# Patient Record
Sex: MALE | Race: BLACK OR AFRICAN AMERICAN | NOT HISPANIC OR LATINO | URBAN - METROPOLITAN AREA
[De-identification: names, ages, dates, MRNs, and addresses within clinical notes are randomized per-mention and may not be internally consistent; named-entity substitution may affect disease eponyms.]

---

## 2020-01-14 ENCOUNTER — ANESTHESIA EVENT (OUTPATIENT)
Dept: SURGERY | Facility: HOSPITAL | Age: 25
DRG: 356 | End: 2020-01-14
Payer: MEDICAID

## 2020-01-14 ENCOUNTER — HOSPITAL ENCOUNTER (INPATIENT)
Facility: HOSPITAL | Age: 25
LOS: 2 days | Discharge: HOME | DRG: 356 | End: 2020-01-16
Attending: EMERGENCY MEDICINE | Admitting: SURGERY
Payer: MEDICAID

## 2020-01-14 ENCOUNTER — ANESTHESIA (OUTPATIENT)
Dept: SURGERY | Facility: HOSPITAL | Age: 25
DRG: 356 | End: 2020-01-14
Payer: MEDICAID

## 2020-01-14 DIAGNOSIS — R10.10 PAIN OF UPPER ABDOMEN: ICD-10-CM

## 2020-01-14 DIAGNOSIS — F11.93 OPIATE WITHDRAWAL: ICD-10-CM

## 2020-01-14 DIAGNOSIS — F11.10 MILD TRAMADOL USE DISORDER: ICD-10-CM

## 2020-01-14 DIAGNOSIS — K56.1 INTUSSUSCEPTION: Primary | ICD-10-CM

## 2020-01-14 DIAGNOSIS — F12.10 MARIJUANA ABUSE: ICD-10-CM

## 2020-01-14 LAB
ALBUMIN SERPL BCP-MCNC: 4.7 G/DL (ref 3.5–5.2)
ALP SERPL-CCNC: 98 U/L (ref 55–135)
ALT SERPL W/O P-5'-P-CCNC: 15 U/L (ref 10–44)
ANION GAP SERPL CALC-SCNC: 9 MMOL/L (ref 8–16)
AST SERPL-CCNC: 24 U/L (ref 10–40)
BACTERIA #/AREA URNS AUTO: NORMAL /HPF
BASOPHILS # BLD AUTO: 0.02 K/UL (ref 0–0.2)
BASOPHILS NFR BLD: 0.2 % (ref 0–1.9)
BILIRUB SERPL-MCNC: 0.8 MG/DL (ref 0.1–1)
BILIRUB UR QL STRIP: NEGATIVE
BUN SERPL-MCNC: 10 MG/DL (ref 6–20)
CALCIUM SERPL-MCNC: 9.4 MG/DL (ref 8.7–10.5)
CHLORIDE SERPL-SCNC: 107 MMOL/L (ref 95–110)
CLARITY UR REFRACT.AUTO: CLEAR
CO2 SERPL-SCNC: 22 MMOL/L (ref 23–29)
COLOR UR AUTO: YELLOW
CREAT SERPL-MCNC: 0.8 MG/DL (ref 0.5–1.4)
DIFFERENTIAL METHOD: ABNORMAL
EOSINOPHIL # BLD AUTO: 0 K/UL (ref 0–0.5)
EOSINOPHIL NFR BLD: 0 % (ref 0–8)
ERYTHROCYTE [DISTWIDTH] IN BLOOD BY AUTOMATED COUNT: 13.7 % (ref 11.5–14.5)
EST. GFR  (AFRICAN AMERICAN): >60 ML/MIN/1.73 M^2
EST. GFR  (NON AFRICAN AMERICAN): >60 ML/MIN/1.73 M^2
GLUCOSE SERPL-MCNC: 154 MG/DL (ref 70–110)
GLUCOSE UR QL STRIP: NEGATIVE
HCT VFR BLD AUTO: 43.2 % (ref 40–54)
HGB BLD-MCNC: 14.1 G/DL (ref 14–18)
HGB UR QL STRIP: NEGATIVE
HYALINE CASTS UR QL AUTO: 1 /LPF
IMM GRANULOCYTES # BLD AUTO: 0.02 K/UL (ref 0–0.04)
IMM GRANULOCYTES NFR BLD AUTO: 0.2 % (ref 0–0.5)
KETONES UR QL STRIP: NEGATIVE
LEUKOCYTE ESTERASE UR QL STRIP: NEGATIVE
LIPASE SERPL-CCNC: 14 U/L (ref 4–60)
LYMPHOCYTES # BLD AUTO: 0.9 K/UL (ref 1–4.8)
LYMPHOCYTES NFR BLD: 9.6 % (ref 18–48)
MCH RBC QN AUTO: 31.4 PG (ref 27–31)
MCHC RBC AUTO-ENTMCNC: 32.6 G/DL (ref 32–36)
MCV RBC AUTO: 96 FL (ref 82–98)
MICROSCOPIC COMMENT: NORMAL
MONOCYTES # BLD AUTO: 0.3 K/UL (ref 0.3–1)
MONOCYTES NFR BLD: 3.2 % (ref 4–15)
NEUTROPHILS # BLD AUTO: 8.4 K/UL (ref 1.8–7.7)
NEUTROPHILS NFR BLD: 86.8 % (ref 38–73)
NITRITE UR QL STRIP: NEGATIVE
NON-SQ EPI CELLS #/AREA URNS AUTO: 0 /HPF
NRBC BLD-RTO: 0 /100 WBC
PH UR STRIP: 7 [PH] (ref 5–8)
PLATELET # BLD AUTO: 186 K/UL (ref 150–350)
PMV BLD AUTO: 11.5 FL (ref 9.2–12.9)
POTASSIUM SERPL-SCNC: 4.3 MMOL/L (ref 3.5–5.1)
PROT SERPL-MCNC: 7.9 G/DL (ref 6–8.4)
PROT UR QL STRIP: ABNORMAL
RBC # BLD AUTO: 4.49 M/UL (ref 4.6–6.2)
RBC #/AREA URNS AUTO: 2 /HPF (ref 0–4)
SODIUM SERPL-SCNC: 138 MMOL/L (ref 136–145)
SP GR UR STRIP: >1.03 (ref 1–1.03)
SQUAMOUS #/AREA URNS AUTO: 0 /HPF
URN SPEC COLLECT METH UR: ABNORMAL
WBC # BLD AUTO: 9.72 K/UL (ref 3.9–12.7)
WBC #/AREA URNS AUTO: 2 /HPF (ref 0–5)
WBC CLUMPS UR QL AUTO: NORMAL
YEAST UR QL AUTO: NORMAL

## 2020-01-14 PROCEDURE — 81001 URINALYSIS AUTO W/SCOPE: CPT

## 2020-01-14 PROCEDURE — 99222 PR INITIAL HOSPITAL CARE,LEVL II: ICD-10-PCS | Mod: 57,ICN,, | Performed by: SURGERY

## 2020-01-14 PROCEDURE — 99222 1ST HOSP IP/OBS MODERATE 55: CPT | Mod: 57,ICN,, | Performed by: SURGERY

## 2020-01-14 PROCEDURE — 36000706: Performed by: SURGERY

## 2020-01-14 PROCEDURE — 63600175 PHARM REV CODE 636 W HCPCS: Performed by: NURSE ANESTHETIST, CERTIFIED REGISTERED

## 2020-01-14 PROCEDURE — 99285 EMERGENCY DEPT VISIT HI MDM: CPT | Mod: 25

## 2020-01-14 PROCEDURE — 49000 EXPLORATION OF ABDOMEN: CPT | Mod: ,,, | Performed by: SURGERY

## 2020-01-14 PROCEDURE — 80053 COMPREHEN METABOLIC PANEL: CPT

## 2020-01-14 PROCEDURE — 37000009 HC ANESTHESIA EA ADD 15 MINS: Performed by: SURGERY

## 2020-01-14 PROCEDURE — 71000033 HC RECOVERY, INTIAL HOUR: Performed by: SURGERY

## 2020-01-14 PROCEDURE — G0378 HOSPITAL OBSERVATION PER HR: HCPCS

## 2020-01-14 PROCEDURE — 99285 PR EMERGENCY DEPT VISIT,LEVEL V: ICD-10-PCS | Mod: ,,, | Performed by: EMERGENCY MEDICINE

## 2020-01-14 PROCEDURE — D9220A PRA ANESTHESIA: ICD-10-PCS | Mod: ANES,,, | Performed by: ANESTHESIOLOGY

## 2020-01-14 PROCEDURE — 85025 COMPLETE CBC W/AUTO DIFF WBC: CPT

## 2020-01-14 PROCEDURE — D9220A PRA ANESTHESIA: Mod: ANES,,, | Performed by: ANESTHESIOLOGY

## 2020-01-14 PROCEDURE — 25500020 PHARM REV CODE 255: Performed by: EMERGENCY MEDICINE

## 2020-01-14 PROCEDURE — 63600175 PHARM REV CODE 636 W HCPCS: Performed by: EMERGENCY MEDICINE

## 2020-01-14 PROCEDURE — 49000 PR EXPLORATORY OF ABDOMEN: ICD-10-PCS | Mod: ,,, | Performed by: SURGERY

## 2020-01-14 PROCEDURE — 96361 HYDRATE IV INFUSION ADD-ON: CPT

## 2020-01-14 PROCEDURE — 25000003 PHARM REV CODE 250: Performed by: EMERGENCY MEDICINE

## 2020-01-14 PROCEDURE — 96375 TX/PRO/DX INJ NEW DRUG ADDON: CPT

## 2020-01-14 PROCEDURE — 96376 TX/PRO/DX INJ SAME DRUG ADON: CPT

## 2020-01-14 PROCEDURE — 36000707: Performed by: SURGERY

## 2020-01-14 PROCEDURE — 37000008 HC ANESTHESIA 1ST 15 MINUTES: Performed by: SURGERY

## 2020-01-14 PROCEDURE — 25500020 PHARM REV CODE 255: Performed by: STUDENT IN AN ORGANIZED HEALTH CARE EDUCATION/TRAINING PROGRAM

## 2020-01-14 PROCEDURE — 96372 THER/PROPH/DIAG INJ SC/IM: CPT | Mod: 59

## 2020-01-14 PROCEDURE — D9220A PRA ANESTHESIA: Mod: CRNA,,, | Performed by: NURSE ANESTHETIST, CERTIFIED REGISTERED

## 2020-01-14 PROCEDURE — 96366 THER/PROPH/DIAG IV INF ADDON: CPT

## 2020-01-14 PROCEDURE — 83690 ASSAY OF LIPASE: CPT

## 2020-01-14 PROCEDURE — 96365 THER/PROPH/DIAG IV INF INIT: CPT

## 2020-01-14 PROCEDURE — 99285 EMERGENCY DEPT VISIT HI MDM: CPT | Mod: ,,, | Performed by: EMERGENCY MEDICINE

## 2020-01-14 PROCEDURE — 12000002 HC ACUTE/MED SURGE SEMI-PRIVATE ROOM

## 2020-01-14 PROCEDURE — D9220A PRA ANESTHESIA: ICD-10-PCS | Mod: CRNA,,, | Performed by: NURSE ANESTHETIST, CERTIFIED REGISTERED

## 2020-01-14 PROCEDURE — S0028 INJECTION, FAMOTIDINE, 20 MG: HCPCS | Performed by: EMERGENCY MEDICINE

## 2020-01-14 RX ORDER — ONDANSETRON 4 MG/1
4 TABLET, ORALLY DISINTEGRATING ORAL EVERY 8 HOURS PRN
Qty: 12 TABLET | Refills: 0 | Status: SHIPPED | OUTPATIENT
Start: 2020-01-14 | End: 2020-01-16 | Stop reason: SDUPTHER

## 2020-01-14 RX ORDER — MORPHINE SULFATE 4 MG/ML
4 INJECTION, SOLUTION INTRAMUSCULAR; INTRAVENOUS EVERY 4 HOURS PRN
Status: DISCONTINUED | OUTPATIENT
Start: 2020-01-14 | End: 2020-01-15

## 2020-01-14 RX ORDER — TALC
6 POWDER (GRAM) TOPICAL NIGHTLY PRN
Status: DISCONTINUED | OUTPATIENT
Start: 2020-01-14 | End: 2020-01-16 | Stop reason: HOSPADM

## 2020-01-14 RX ORDER — DICYCLOMINE HYDROCHLORIDE 20 MG/1
20 TABLET ORAL 2 TIMES DAILY
Qty: 20 TABLET | Refills: 0 | Status: SHIPPED | OUTPATIENT
Start: 2020-01-14 | End: 2020-01-14 | Stop reason: SDUPTHER

## 2020-01-14 RX ORDER — MORPHINE SULFATE 2 MG/ML
2 INJECTION, SOLUTION INTRAMUSCULAR; INTRAVENOUS EVERY 4 HOURS PRN
Status: DISCONTINUED | OUTPATIENT
Start: 2020-01-14 | End: 2020-01-15

## 2020-01-14 RX ORDER — DEXTROSE MONOHYDRATE, SODIUM CHLORIDE, AND POTASSIUM CHLORIDE 50; 1.49; 4.5 G/1000ML; G/1000ML; G/1000ML
1000 INJECTION, SOLUTION INTRAVENOUS
Status: COMPLETED | OUTPATIENT
Start: 2020-01-14 | End: 2020-01-14

## 2020-01-14 RX ORDER — ONDANSETRON 2 MG/ML
8 INJECTION INTRAMUSCULAR; INTRAVENOUS
Status: COMPLETED | OUTPATIENT
Start: 2020-01-14 | End: 2020-01-14

## 2020-01-14 RX ORDER — SODIUM CHLORIDE, SODIUM LACTATE, POTASSIUM CHLORIDE, CALCIUM CHLORIDE 600; 310; 30; 20 MG/100ML; MG/100ML; MG/100ML; MG/100ML
INJECTION, SOLUTION INTRAVENOUS CONTINUOUS
Status: DISCONTINUED | OUTPATIENT
Start: 2020-01-14 | End: 2020-01-16

## 2020-01-14 RX ORDER — ACETAMINOPHEN 325 MG/1
650 TABLET ORAL EVERY 8 HOURS PRN
Status: DISCONTINUED | OUTPATIENT
Start: 2020-01-14 | End: 2020-01-16

## 2020-01-14 RX ORDER — ONDANSETRON 2 MG/ML
4 INJECTION INTRAMUSCULAR; INTRAVENOUS EVERY 6 HOURS PRN
Status: DISCONTINUED | OUTPATIENT
Start: 2020-01-14 | End: 2020-01-16 | Stop reason: HOSPADM

## 2020-01-14 RX ORDER — LORAZEPAM 2 MG/ML
1 INJECTION INTRAMUSCULAR
Status: COMPLETED | OUTPATIENT
Start: 2020-01-14 | End: 2020-01-14

## 2020-01-14 RX ORDER — ONDANSETRON 4 MG/1
4 TABLET, ORALLY DISINTEGRATING ORAL EVERY 8 HOURS PRN
Qty: 12 TABLET | Refills: 0 | Status: SHIPPED | OUTPATIENT
Start: 2020-01-14 | End: 2020-01-14 | Stop reason: SDUPTHER

## 2020-01-14 RX ORDER — DICYCLOMINE HYDROCHLORIDE 20 MG/1
20 TABLET ORAL 2 TIMES DAILY
Qty: 20 TABLET | Refills: 0 | Status: SHIPPED | OUTPATIENT
Start: 2020-01-14 | End: 2020-01-16 | Stop reason: SDUPTHER

## 2020-01-14 RX ORDER — ONDANSETRON 2 MG/ML
4 INJECTION INTRAMUSCULAR; INTRAVENOUS
Status: COMPLETED | OUTPATIENT
Start: 2020-01-14 | End: 2020-01-14

## 2020-01-14 RX ORDER — SODIUM CHLORIDE 0.9 % (FLUSH) 0.9 %
10 SYRINGE (ML) INJECTION
Status: DISCONTINUED | OUTPATIENT
Start: 2020-01-14 | End: 2020-01-16 | Stop reason: HOSPADM

## 2020-01-14 RX ORDER — FAMOTIDINE 10 MG/ML
20 INJECTION INTRAVENOUS
Status: COMPLETED | OUTPATIENT
Start: 2020-01-14 | End: 2020-01-14

## 2020-01-14 RX ORDER — DICYCLOMINE HYDROCHLORIDE 10 MG/ML
20 INJECTION INTRAMUSCULAR
Status: COMPLETED | OUTPATIENT
Start: 2020-01-14 | End: 2020-01-14

## 2020-01-14 RX ADMIN — IOHEXOL 75 ML: 350 INJECTION, SOLUTION INTRAVENOUS at 03:01

## 2020-01-14 RX ADMIN — ROCURONIUM BROMIDE 10 MG: 10 INJECTION, SOLUTION INTRAVENOUS at 11:01

## 2020-01-14 RX ADMIN — DEXTROSE, SODIUM CHLORIDE, AND POTASSIUM CHLORIDE 1000 ML: 5; .45; .15 INJECTION INTRAVENOUS at 06:01

## 2020-01-14 RX ADMIN — ONDANSETRON 4 MG: 2 INJECTION INTRAMUSCULAR; INTRAVENOUS at 05:01

## 2020-01-14 RX ADMIN — FENTANYL CITRATE 50 MCG: 50 INJECTION, SOLUTION INTRAMUSCULAR; INTRAVENOUS at 11:01

## 2020-01-14 RX ADMIN — DICYCLOMINE HYDROCHLORIDE 20 MG: 20 INJECTION, SOLUTION INTRAMUSCULAR at 09:01

## 2020-01-14 RX ADMIN — ONDANSETRON 8 MG: 2 INJECTION INTRAMUSCULAR; INTRAVENOUS at 12:01

## 2020-01-14 RX ADMIN — FAMOTIDINE 20 MG: 10 INJECTION, SOLUTION INTRAVENOUS at 12:01

## 2020-01-14 RX ADMIN — LIDOCAINE HYDROCHLORIDE 100 MG: 20 INJECTION, SOLUTION INTRAVENOUS at 11:01

## 2020-01-14 RX ADMIN — LORAZEPAM 1 MG: 2 INJECTION INTRAMUSCULAR; INTRAVENOUS at 05:01

## 2020-01-14 RX ADMIN — SODIUM CHLORIDE 1000 ML: 0.9 INJECTION, SOLUTION INTRAVENOUS at 12:01

## 2020-01-14 RX ADMIN — PROMETHAZINE HYDROCHLORIDE 12.5 MG: 25 INJECTION INTRAMUSCULAR; INTRAVENOUS at 09:01

## 2020-01-14 RX ADMIN — SODIUM CHLORIDE 1000 ML: 0.9 INJECTION, SOLUTION INTRAVENOUS at 03:01

## 2020-01-14 RX ADMIN — SUCCINYLCHOLINE CHLORIDE 140 MG: 20 INJECTION, SOLUTION INTRAMUSCULAR; INTRAVENOUS at 11:01

## 2020-01-14 RX ADMIN — SODIUM CHLORIDE, SODIUM GLUCONATE, SODIUM ACETATE, POTASSIUM CHLORIDE, MAGNESIUM CHLORIDE, SODIUM PHOSPHATE, DIBASIC, AND POTASSIUM PHOSPHATE: .53; .5; .37; .037; .03; .012; .00082 INJECTION, SOLUTION INTRAVENOUS at 11:01

## 2020-01-14 RX ADMIN — MIDAZOLAM HYDROCHLORIDE 2 MG: 1 INJECTION, SOLUTION INTRAMUSCULAR; INTRAVENOUS at 11:01

## 2020-01-14 RX ADMIN — IOHEXOL 15 ML: 350 INJECTION, SOLUTION INTRAVENOUS at 09:01

## 2020-01-14 RX ADMIN — PROMETHAZINE HYDROCHLORIDE 12.5 MG: 25 INJECTION INTRAMUSCULAR; INTRAVENOUS at 01:01

## 2020-01-14 RX ADMIN — PROPOFOL 200 MG: 10 INJECTION, EMULSION INTRAVENOUS at 11:01

## 2020-01-14 NOTE — ED PROVIDER NOTES
Encounter Date: 1/14/2020       History     Chief Complaint   Patient presents with    Nausea     N/V and abdominal pain onset 3pm, pt diaphoretic     24-year-old male with no significant past medical history presents the emergency room for evaluation of nausea vomiting diarrhea that started this morning.  Patient denies any significant abdominal pain however does have discomfort when he is actually vomiting.  He denies any black or bloody emesis or hematochezia or melena.  Patient does smoke several blunts of marijuana a day.  He has no history of hyperemesis cannabinoid syndrome.  Showers do not improve his symptoms. He denies any fevers but states he has chills all over.  He denies any dysuria headache chest pain shortness of breath back pain hematuria history of kidney stones penile or scrotal pain. He has no sick contacts recent travel or all could foods.  He denies any new medicines.  He denies any other drug use.  He does not take any over-the-counter anti-inflammatories, has no history of ulcers and is not an excessive drinker.        Review of patient's allergies indicates:  No Known Allergies  History reviewed. No pertinent past medical history.  History reviewed. No pertinent surgical history.  No family history on file.  Social History     Tobacco Use    Smoking status: Unknown If Ever Smoked   Substance Use Topics    Alcohol use: Yes     Frequency: Never    Drug use: Yes     Types: Marijuana     Review of Systems   Constitutional: Negative for fever.   HENT: Negative for ear pain, rhinorrhea and sore throat.    Eyes: Negative for pain and visual disturbance.   Respiratory: Negative for cough and shortness of breath.    Cardiovascular: Negative for chest pain.   Gastrointestinal: Positive for abdominal pain (Only when vomiting), diarrhea, nausea and vomiting.   Genitourinary: Negative for difficulty urinating, dysuria, flank pain and hematuria.   Musculoskeletal: Negative for arthralgias.   Skin:  Negative for rash.   Neurological: Negative for weakness, numbness and headaches.   Psychiatric/Behavioral: Negative for agitation and confusion.   All other systems reviewed and are negative.      Physical Exam     Initial Vitals [01/14/20 1122]   BP Pulse Resp Temp SpO2   138/86 62 16 98.6 °F (37 °C) 100 %      MAP       --         Physical Exam    Nursing note and vitals reviewed.  Constitutional: He appears well-developed and well-nourished. He is diaphoretic. No distress.   HENT:   Head: Normocephalic and atraumatic.   Mouth/Throat: Oropharynx is clear and moist.   Eyes: Conjunctivae and EOM are normal. Pupils are equal, round, and reactive to light. No scleral icterus.   Neck: Normal range of motion. Neck supple.   Cardiovascular: Normal rate, regular rhythm, normal heart sounds and intact distal pulses. Exam reveals no gallop and no friction rub.    No murmur heard.  Pulmonary/Chest: Breath sounds normal. He has no wheezes. He has no rhonchi. He has no rales.   Abdominal: Soft. Bowel sounds are normal. There is tenderness (Thin patient but diffusely firm abdomen.  He denies any significant pain however upon palpation).   Musculoskeletal: Normal range of motion. He exhibits no edema.   Neurological: He is alert and oriented to person, place, and time. He has normal strength. No sensory deficit. GCS score is 15. GCS eye subscore is 4. GCS verbal subscore is 5. GCS motor subscore is 6.   Tremulous   Skin: Skin is warm. No rash noted.   Psychiatric: He has a normal mood and affect.         ED Course   Procedures  Labs Reviewed   CBC W/ AUTO DIFFERENTIAL - Abnormal; Notable for the following components:       Result Value    RBC 4.49 (*)     Mean Corpuscular Hemoglobin 31.4 (*)     Gran # (ANC) 8.4 (*)     Lymph # 0.9 (*)     Gran% 86.8 (*)     Lymph% 9.6 (*)     Mono% 3.2 (*)     All other components within normal limits   COMPREHENSIVE METABOLIC PANEL   LIPASE   URINALYSIS, REFLEX TO URINE CULTURE           Imaging Results    None                            ED Course as of Jan 16 0128   Tue Jan 14, 2020   1424 Nausea vomiting is improved.  Awaiting CT scan at this time    [JS]   1600 Patient now admits to taking 4-5 tramadol 50 mg tablets a day for the past month if not longer.  I suspect he is having opiate withdrawal.  Labs are stable on CT is pending.  I will give him Bentyl here.  He is able tolerate fluids.  I will give him outpatient detox resources.    [JS]   1619 Surprisingly CT was readAs possible intussusception.  Patient does feel slightly better.  I still think this is opiate withdrawal.  I will discuss the case with surgery.    [JS]   1715 GeneralSurgery saw the patient wants repeat CT scan with oral with oral contrast only to see if the intussusception has improved.    [JS]   1757 Awaiting repeat ct scan    [JS]   2127 Surgery will review ct scan.  If negative for intussusception pt can go home if feeling better.  If present and persistent pain will likely admit to surgery.  Rx for bentyl and zofran prescribed.     [JS]   2130 Care transferred to Dr. Ontiveros    [JS]      ED Course User Index  [JS] Rodney Simpson MD                Clinical Impression:       ICD-10-CM ICD-9-CM   1. Intussusception K56.1 560.0   2. Opiate withdrawal F11.23 292.0     304.00   3. Pain of upper abdomen R10.10 789.09   4. 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 Marijuana abuse F12.10 305.20                             Rodney Simpson MD  01/16/20 0129

## 2020-01-14 NOTE — ED NOTES
Pt. Resting in bed in NAD. RR e/u. Continuous cardiac, BP, and O2 monitoring in progress. VS being monitoring continuously per MD orders.Pt aware waiting for CT results.

## 2020-01-14 NOTE — ED TRIAGE NOTES
Pt presents to ed reports vomiting this morning and abdominal pain. Pt presents diaphoretic. Pt denies drug use.

## 2020-01-15 PROBLEM — F12.10 MARIJUANA ABUSE: Status: ACTIVE | Noted: 2020-01-15

## 2020-01-15 PROBLEM — F11.10: Status: ACTIVE | Noted: 2020-01-15

## 2020-01-15 LAB
ALBUMIN SERPL BCP-MCNC: 4.1 G/DL (ref 3.5–5.2)
ALP SERPL-CCNC: 83 U/L (ref 55–135)
ALT SERPL W/O P-5'-P-CCNC: 11 U/L (ref 10–44)
ANION GAP SERPL CALC-SCNC: 10 MMOL/L (ref 8–16)
AST SERPL-CCNC: 23 U/L (ref 10–40)
BASOPHILS # BLD AUTO: 0.02 K/UL (ref 0–0.2)
BASOPHILS NFR BLD: 0.1 % (ref 0–1.9)
BILIRUB SERPL-MCNC: 0.9 MG/DL (ref 0.1–1)
BUN SERPL-MCNC: 7 MG/DL (ref 6–20)
CALCIUM SERPL-MCNC: 8.9 MG/DL (ref 8.7–10.5)
CHLORIDE SERPL-SCNC: 105 MMOL/L (ref 95–110)
CO2 SERPL-SCNC: 23 MMOL/L (ref 23–29)
CREAT SERPL-MCNC: 0.8 MG/DL (ref 0.5–1.4)
DIFFERENTIAL METHOD: ABNORMAL
EOSINOPHIL # BLD AUTO: 0 K/UL (ref 0–0.5)
EOSINOPHIL NFR BLD: 0 % (ref 0–8)
ERYTHROCYTE [DISTWIDTH] IN BLOOD BY AUTOMATED COUNT: 13.8 % (ref 11.5–14.5)
EST. GFR  (AFRICAN AMERICAN): >60 ML/MIN/1.73 M^2
EST. GFR  (NON AFRICAN AMERICAN): >60 ML/MIN/1.73 M^2
GLUCOSE SERPL-MCNC: 143 MG/DL (ref 70–110)
HCT VFR BLD AUTO: 39.6 % (ref 40–54)
HGB BLD-MCNC: 13.2 G/DL (ref 14–18)
IMM GRANULOCYTES # BLD AUTO: 0.09 K/UL (ref 0–0.04)
IMM GRANULOCYTES NFR BLD AUTO: 0.5 % (ref 0–0.5)
INR PPP: 1.1 (ref 0.8–1.2)
LYMPHOCYTES # BLD AUTO: 0.8 K/UL (ref 1–4.8)
LYMPHOCYTES NFR BLD: 4.7 % (ref 18–48)
MAGNESIUM SERPL-MCNC: 1.7 MG/DL (ref 1.6–2.6)
MCH RBC QN AUTO: 31.6 PG (ref 27–31)
MCHC RBC AUTO-ENTMCNC: 33.3 G/DL (ref 32–36)
MCV RBC AUTO: 95 FL (ref 82–98)
MONOCYTES # BLD AUTO: 0.5 K/UL (ref 0.3–1)
MONOCYTES NFR BLD: 2.8 % (ref 4–15)
NEUTROPHILS # BLD AUTO: 16.1 K/UL (ref 1.8–7.7)
NEUTROPHILS NFR BLD: 91.9 % (ref 38–73)
NRBC BLD-RTO: 0 /100 WBC
PHOSPHATE SERPL-MCNC: 3 MG/DL (ref 2.7–4.5)
PLATELET # BLD AUTO: 173 K/UL (ref 150–350)
PLATELET BLD QL SMEAR: ABNORMAL
PMV BLD AUTO: 12.1 FL (ref 9.2–12.9)
POTASSIUM SERPL-SCNC: 3.7 MMOL/L (ref 3.5–5.1)
PROT SERPL-MCNC: 6.9 G/DL (ref 6–8.4)
PROTHROMBIN TIME: 10.9 SEC (ref 9–12.5)
RBC # BLD AUTO: 4.18 M/UL (ref 4.6–6.2)
SODIUM SERPL-SCNC: 138 MMOL/L (ref 136–145)
WBC # BLD AUTO: 17.58 K/UL (ref 3.9–12.7)

## 2020-01-15 PROCEDURE — 36415 COLL VENOUS BLD VENIPUNCTURE: CPT

## 2020-01-15 PROCEDURE — 96375 TX/PRO/DX INJ NEW DRUG ADDON: CPT | Performed by: EMERGENCY MEDICINE

## 2020-01-15 PROCEDURE — 25000003 PHARM REV CODE 250: Performed by: NURSE ANESTHETIST, CERTIFIED REGISTERED

## 2020-01-15 PROCEDURE — 85025 COMPLETE CBC W/AUTO DIFF WBC: CPT

## 2020-01-15 PROCEDURE — 63600175 PHARM REV CODE 636 W HCPCS: Performed by: NURSE ANESTHETIST, CERTIFIED REGISTERED

## 2020-01-15 PROCEDURE — 83735 ASSAY OF MAGNESIUM: CPT

## 2020-01-15 PROCEDURE — C9290 INJ, BUPIVACAINE LIPOSOME: HCPCS | Performed by: SURGERY

## 2020-01-15 PROCEDURE — 85610 PROTHROMBIN TIME: CPT

## 2020-01-15 PROCEDURE — 84100 ASSAY OF PHOSPHORUS: CPT

## 2020-01-15 PROCEDURE — 12000002 HC ACUTE/MED SURGE SEMI-PRIVATE ROOM

## 2020-01-15 PROCEDURE — 63600175 PHARM REV CODE 636 W HCPCS: Performed by: STUDENT IN AN ORGANIZED HEALTH CARE EDUCATION/TRAINING PROGRAM

## 2020-01-15 PROCEDURE — S0028 INJECTION, FAMOTIDINE, 20 MG: HCPCS | Performed by: NURSE ANESTHETIST, CERTIFIED REGISTERED

## 2020-01-15 PROCEDURE — 25000242 PHARM REV CODE 250 ALT 637 W/ HCPCS: Performed by: NURSE ANESTHETIST, CERTIFIED REGISTERED

## 2020-01-15 PROCEDURE — 80053 COMPREHEN METABOLIC PANEL: CPT

## 2020-01-15 PROCEDURE — 63600175 PHARM REV CODE 636 W HCPCS: Performed by: SURGERY

## 2020-01-15 PROCEDURE — 99233 SBSQ HOSP IP/OBS HIGH 50: CPT | Mod: ,,, | Performed by: PSYCHIATRY & NEUROLOGY

## 2020-01-15 PROCEDURE — 99233 PR SUBSEQUENT HOSPITAL CARE,LEVL III: ICD-10-PCS | Mod: ,,, | Performed by: PSYCHIATRY & NEUROLOGY

## 2020-01-15 RX ORDER — CEFAZOLIN SODIUM 1 G/3ML
INJECTION, POWDER, FOR SOLUTION INTRAMUSCULAR; INTRAVENOUS
Status: DISCONTINUED | OUTPATIENT
Start: 2020-01-15 | End: 2020-01-15

## 2020-01-15 RX ORDER — ALBUTEROL SULFATE 90 UG/1
AEROSOL, METERED RESPIRATORY (INHALATION)
Status: DISCONTINUED | OUTPATIENT
Start: 2020-01-15 | End: 2020-01-15

## 2020-01-15 RX ORDER — HYDROMORPHONE HYDROCHLORIDE 1 MG/ML
0.2 INJECTION, SOLUTION INTRAMUSCULAR; INTRAVENOUS; SUBCUTANEOUS EVERY 5 MIN PRN
Status: DISCONTINUED | OUTPATIENT
Start: 2020-01-15 | End: 2020-01-15 | Stop reason: HOSPADM

## 2020-01-15 RX ORDER — NEOSTIGMINE METHYLSULFATE 0.5 MG/ML
INJECTION, SOLUTION INTRAVENOUS
Status: DISCONTINUED | OUTPATIENT
Start: 2020-01-15 | End: 2020-01-15

## 2020-01-15 RX ORDER — FENTANYL CITRATE 50 UG/ML
INJECTION, SOLUTION INTRAMUSCULAR; INTRAVENOUS
Status: DISCONTINUED | OUTPATIENT
Start: 2020-01-14 | End: 2020-01-15

## 2020-01-15 RX ORDER — LIDOCAINE HCL/PF 100 MG/5ML
SYRINGE (ML) INTRAVENOUS
Status: DISCONTINUED | OUTPATIENT
Start: 2020-01-14 | End: 2020-01-15

## 2020-01-15 RX ORDER — SODIUM CHLORIDE 0.9 % (FLUSH) 0.9 %
3 SYRINGE (ML) INJECTION
Status: DISCONTINUED | OUTPATIENT
Start: 2020-01-15 | End: 2020-01-15 | Stop reason: HOSPADM

## 2020-01-15 RX ORDER — GLYCOPYRROLATE 0.2 MG/ML
INJECTION INTRAMUSCULAR; INTRAVENOUS
Status: DISCONTINUED | OUTPATIENT
Start: 2020-01-15 | End: 2020-01-15

## 2020-01-15 RX ORDER — ACETAMINOPHEN 10 MG/ML
INJECTION, SOLUTION INTRAVENOUS
Status: DISCONTINUED | OUTPATIENT
Start: 2020-01-15 | End: 2020-01-15

## 2020-01-15 RX ORDER — LABETALOL HYDROCHLORIDE 5 MG/ML
INJECTION, SOLUTION INTRAVENOUS
Status: DISCONTINUED | OUTPATIENT
Start: 2020-01-15 | End: 2020-01-15

## 2020-01-15 RX ORDER — DEXMEDETOMIDINE HYDROCHLORIDE 100 UG/ML
INJECTION, SOLUTION INTRAVENOUS
Status: DISCONTINUED | OUTPATIENT
Start: 2020-01-15 | End: 2020-01-15

## 2020-01-15 RX ORDER — NALOXONE HCL 0.4 MG/ML
0.02 VIAL (ML) INJECTION
Status: DISCONTINUED | OUTPATIENT
Start: 2020-01-15 | End: 2020-01-16

## 2020-01-15 RX ORDER — ROCURONIUM BROMIDE 10 MG/ML
INJECTION, SOLUTION INTRAVENOUS
Status: DISCONTINUED | OUTPATIENT
Start: 2020-01-14 | End: 2020-01-15

## 2020-01-15 RX ORDER — HYDROMORPHONE HCL IN 0.9% NACL 6 MG/30 ML
PATIENT CONTROLLED ANALGESIA SYRINGE INTRAVENOUS
Status: DISPENSED
Start: 2020-01-15 | End: 2020-01-15

## 2020-01-15 RX ORDER — ONDANSETRON 2 MG/ML
INJECTION INTRAMUSCULAR; INTRAVENOUS
Status: DISCONTINUED | OUTPATIENT
Start: 2020-01-15 | End: 2020-01-15

## 2020-01-15 RX ORDER — FAMOTIDINE 10 MG/ML
INJECTION INTRAVENOUS
Status: DISCONTINUED | OUTPATIENT
Start: 2020-01-15 | End: 2020-01-15

## 2020-01-15 RX ORDER — PHENYLEPHRINE HYDROCHLORIDE 10 MG/ML
INJECTION INTRAVENOUS
Status: DISCONTINUED | OUTPATIENT
Start: 2020-01-15 | End: 2020-01-15

## 2020-01-15 RX ORDER — SUCCINYLCHOLINE CHLORIDE 20 MG/ML
INJECTION INTRAMUSCULAR; INTRAVENOUS
Status: DISCONTINUED | OUTPATIENT
Start: 2020-01-14 | End: 2020-01-15

## 2020-01-15 RX ORDER — MIDAZOLAM HYDROCHLORIDE 1 MG/ML
INJECTION, SOLUTION INTRAMUSCULAR; INTRAVENOUS
Status: DISCONTINUED | OUTPATIENT
Start: 2020-01-14 | End: 2020-01-15

## 2020-01-15 RX ORDER — HYDROMORPHONE HCL IN 0.9% NACL 6 MG/30 ML
PATIENT CONTROLLED ANALGESIA SYRINGE INTRAVENOUS CONTINUOUS
Status: DISCONTINUED | OUTPATIENT
Start: 2020-01-15 | End: 2020-01-16

## 2020-01-15 RX ORDER — ESMOLOL HYDROCHLORIDE 10 MG/ML
INJECTION INTRAVENOUS
Status: DISCONTINUED | OUTPATIENT
Start: 2020-01-15 | End: 2020-01-15

## 2020-01-15 RX ORDER — DEXAMETHASONE SODIUM PHOSPHATE 4 MG/ML
INJECTION, SOLUTION INTRA-ARTICULAR; INTRALESIONAL; INTRAMUSCULAR; INTRAVENOUS; SOFT TISSUE
Status: DISCONTINUED | OUTPATIENT
Start: 2020-01-15 | End: 2020-01-15

## 2020-01-15 RX ORDER — PROPOFOL 10 MG/ML
VIAL (ML) INTRAVENOUS
Status: DISCONTINUED | OUTPATIENT
Start: 2020-01-14 | End: 2020-01-15

## 2020-01-15 RX ADMIN — GLYCOPYRROLATE 0.4 MG: 0.2 INJECTION, SOLUTION INTRAMUSCULAR; INTRAVENOUS at 01:01

## 2020-01-15 RX ADMIN — SODIUM CHLORIDE, SODIUM GLUCONATE, SODIUM ACETATE, POTASSIUM CHLORIDE, MAGNESIUM CHLORIDE, SODIUM PHOSPHATE, DIBASIC, AND POTASSIUM PHOSPHATE: .53; .5; .37; .037; .03; .012; .00082 INJECTION, SOLUTION INTRAVENOUS at 12:01

## 2020-01-15 RX ADMIN — CEFAZOLIN 2 G: 330 INJECTION, POWDER, FOR SOLUTION INTRAMUSCULAR; INTRAVENOUS at 12:01

## 2020-01-15 RX ADMIN — DEXAMETHASONE SODIUM PHOSPHATE 8 MG: 4 INJECTION, SOLUTION INTRAMUSCULAR; INTRAVENOUS at 12:01

## 2020-01-15 RX ADMIN — ESMOLOL HYDROCHLORIDE 20 MG: 10 INJECTION INTRAVENOUS at 12:01

## 2020-01-15 RX ADMIN — ALBUTEROL SULFATE 6 PUFF: 90 AEROSOL, METERED RESPIRATORY (INHALATION) at 12:01

## 2020-01-15 RX ADMIN — ONDANSETRON 4 MG: 2 INJECTION INTRAMUSCULAR; INTRAVENOUS at 12:01

## 2020-01-15 RX ADMIN — Medication: at 01:01

## 2020-01-15 RX ADMIN — METHOCARBAMOL 500 MG: 100 INJECTION INTRAMUSCULAR; INTRAVENOUS at 04:01

## 2020-01-15 RX ADMIN — ACETAMINOPHEN 1000 MG: 10 INJECTION, SOLUTION INTRAVENOUS at 12:01

## 2020-01-15 RX ADMIN — FENTANYL CITRATE 50 MCG: 50 INJECTION, SOLUTION INTRAMUSCULAR; INTRAVENOUS at 12:01

## 2020-01-15 RX ADMIN — LABETALOL HYDROCHLORIDE 5 MG: 5 INJECTION, SOLUTION INTRAVENOUS at 12:01

## 2020-01-15 RX ADMIN — SODIUM CHLORIDE, SODIUM LACTATE, POTASSIUM CHLORIDE, AND CALCIUM CHLORIDE: .6; .31; .03; .02 INJECTION, SOLUTION INTRAVENOUS at 01:01

## 2020-01-15 RX ADMIN — PHENYLEPHRINE HYDROCHLORIDE 100 MCG: 10 INJECTION INTRAVENOUS at 12:01

## 2020-01-15 RX ADMIN — FENTANYL CITRATE 50 MCG: 50 INJECTION, SOLUTION INTRAMUSCULAR; INTRAVENOUS at 01:01

## 2020-01-15 RX ADMIN — ROCURONIUM BROMIDE 30 MG: 10 INJECTION, SOLUTION INTRAVENOUS at 12:01

## 2020-01-15 RX ADMIN — NEOSTIGMINE METHYLSULFATE 4 MG: 0.5 INJECTION INTRAVENOUS at 01:01

## 2020-01-15 RX ADMIN — DEXMEDETOMIDINE HYDROCHLORIDE 20 MCG: 100 INJECTION, SOLUTION, CONCENTRATE INTRAVENOUS at 12:01

## 2020-01-15 RX ADMIN — FAMOTIDINE 20 MG: 10 INJECTION, SOLUTION INTRAVENOUS at 12:01

## 2020-01-15 RX ADMIN — ESMOLOL HYDROCHLORIDE 10 MG: 10 INJECTION INTRAVENOUS at 12:01

## 2020-01-15 NOTE — BRIEF OP NOTE
[facility]  Surgery Department  Operative Note    SUMMARY     Date of Procedure: 1/14/2020     Procedure: Procedure(s) (LRB):  LAPAROTOMY, EXPLORATORY (N/A)     Surgeon(s) and Role:     * Booker Irvin MD - Primary     * Maggi Barrett MD - Resident - Assisting        Pre-Operative Diagnosis: Intussusception [K56.1]    Post-Operative Diagnosis: Post-Op Diagnosis Codes:     * Intussusception [K56.1]    Anesthesia: Choice    Technical Procedures Used: Exploratory laparotomy    Description of the Findings of the Procedure: No acute pathology found. Bowel run from terminal ileum to ligament of Treitz several times. NG tube placement confirmed in OR.    Significant Surgical Tasks Conducted by the Assistant(s), if Applicable: n/a    Complications: No    Estimated Blood Loss (EBL): * No values recorded between 1/15/2020 12:17 AM and 1/15/2020  1:08 AM *           Implants: * No implants in log *    Specimens:   Specimen (12h ago, onward)    None                  Condition: Good    Disposition: PACU - hemodynamically stable.    Attestation: I was present and scrubbed for the entire procedure.

## 2020-01-15 NOTE — PLAN OF CARE
01/15/20 1454   Post-Acute Status   Post-Acute Authorization Placement;Other   Post-Acute Placement Status Awaiting Internal Medical Clearance     Patient not medically ready at this time, Post acute needs to be determined. SW will continue to follow up.    Monserrat Dash LMSW  [facility]   [id number]

## 2020-01-15 NOTE — NURSING TRANSFER
Nursing Transfer Note      1/15/2020     Transfer To: 545 B from PACU    Transfer via stretcher    Transfer with IV pole, patients briefs in plastic bag    Transported by Transport    Medicines sent: LR infusing, Dilaudid PCA, Et monitor    Chart send with patient: Yes    Notified: Km notified, waiting in patient room    Patient reassessed at: 1/15/2020 2:30am

## 2020-01-15 NOTE — HPI
ADDICTION CONSULT INITIAL EVALUATION     DEPARTMENT:  Psychiatry  SITE: [facility] [address]    DATE OF ADMISSION: 1/14/2020 12:02 PM  LENGTH OF STAY: 0 days    EXAMINING PRACTITIONER: Cristobal Rivas    CONSULT REQUESTED BY: Booker Irvin MD      SUBJECTIVE     CHIEF COMPLAINT  Fenrando Massey is a 24 y.o. male who is seen today for an initial psychiatric evaluation by the addiction psychiatry consult service.  Fernando Massey presents with the chief complaint of: tramadol w/d      HISTORY OF PRESENT ILLNESS    Per Primary MD:  Patient is a 24 y.o. male with a past medical history significant for opoid abuse who presented to the ED after acute onset abdominal pain earlier today that woke him from sleep. He describes it as a dull, continuous deep pain which he has never experienced before. The pain was associated with vomiting. He does use tramadol recreationally and was taking roughly 8-9 pills per day over the past several days, last use was 2 days ago. He is still passing flatus and having bowel movements. Upon arrival to the ED he was given zofran, phenergan and bentyl which initially improved his pain, but then it started back again with the vomiting roughly an hour later. CT scan showed small bowel small bowel intussusception. Repeat CT showed persistent intussusception.     clinical concern confounded with narcotic withdrawal s/p negative exploratory laparotomy on 1/14.  Consult addiction psych for narcotic hx    Per Addiction Psych MD:  Pt found sleeping in bed.  Rouses easily upon hearing name called, endorses pain but is grossly oriented.  Pt reports hx recreational tramadol use for the past 3-4 months.  Initially pt reports that he takes 6-8 tramadol tabs when he goes to parties, usually just a few days per week.  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  Pt states that the last time he ingested  "tramadol was on Sunday Jan 12th.  Uses marijuana most days but denies other illicit drug use including PO/IV opiates.  Denies hx tramadol w/d sx.  Denies hx sz.  Denies hx overdose.  Rare EtOH use.  Denies prior psych hx, psych admissions, or hx psychotropics. Pt does not feel that he needs rehab or support to abstain at this time, states that he can stop using his own willpower.  Pt is aware of [facility], states that he will consider going to [facility] or other addiction clinic if his attempt to d/c tramadol is unsuccessful.  Pt has NG tube and says is painful to speak with NG tube in place.    Collateral:  Pt's fathered interviewed separately from pt.  States that pt uses "some kind of pain pill" recreationally.  Pt is fairly open about his drug use, does not appear to make effort to hide it.  Uses a couple of days per week, "mostly when he goes to a party or something."  Pt smokes marijuana but collateral is not concerned about other drug use.  Pt rarely drinks EtOH, "he's not much of a drinker."  Denies knowledge of hx of overdose or prior psych hx.  Pt has held stable job as  and is in a long term relationship with girlfriend.  Feels that it would be in pt's best interest to abstain from pain pills, but does not feel that pt is addicted to them.    SUBSTANCE ABUSE HISTORY  Substance(s) of Choice: marijuana  Substances Used: tramadol, THC  History of IVDU?: denies  Use of Alcohol: rare  Average Consumption: 1-2 drinks  Last Drink: 2+ weeks ago  Use of Medications for Alcohol/Opioid Use Disorder: denies  History of Complicated Withdrawal: denies  History of Detox: denies  Rehab History: denies  AA/NA involvement: denies  Tobacco: yes  Spouse/Partner Consumption: denies  Patient Aware of Biomedical Complications: somewhat    DSM-5 SUBSTANCE USE DISORDER CRITERIA   Mild (1-3), Moderate (4-5), Severe (?6)  1. Often take in larger amounts or over a longer period of time than was intended.  2. Persistent " desire or unsuccessful efforts to cut down or control use.  3. Great deal of time spent in activities necessary to obtain substance, use, or recover from effects.  4. Craving/strong desire for substance or urge to use.  5. Use resulting in failure to fulfill major role obligations at home, work or school.  6. Social, occupational, recreational activities decreased because of use.  7. Continued use despite having persistent or recurrent social or interpersonal problems cause or exaserbated by the substance.  8. Recurrent use in situations in which it is physically hazardous.  9. Use despite physical or psychological problems that are likely to have been caused or exacerbated by the substance.  10. Tolerance, as defined by either of the following.   A. A need for markedly increased amounts of substance to achieve intoxication or desired effect. -OR-    B. A markedly diminished effect with continued use of the same amount of substance.  11. Withdrawal, as manifested by the following.   A. The characteristic withdrawal syndrome for substance. -AND-   B. Substance is taken to relieve or avoid withdrawal symptoms.    ARE THE CRITERIA MET FOR DSM-5 SUBSTANCE USE DISORDER: YES, MILD      PSYCHIATRIC HISTORY  Reported Diagnose(s): denies  Previous Medication Trials: denies  Previous Psychiatric Hospitalizations: denies  Outpatient Psychiatrist: denies      SUICIDE/HOMICIDE RISK ASSESSMENT  Current/active suicidal ideation/plan/intent: denies  Previous suicide attempts: denies  Current/active homicidal ideation/plan/intent: denies      HISTORY OF TRAUMA, ABUSE & VIOLENCE  Trauma: Pt was in [place] for [place]  Physical Abuse: denies  Sexual Abuse: denies  Violent Conduct: denies    Access to Gun: denies       FAMILY PSYCHIATRIC HISTORY   denies       SOCIAL HISTORY  Marital Status: not   Children: 3   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       PSYCHIATRIC ROS   Neg for changes in sleep, appetite, weight, energy, interest, guilt, concentration, SI/SA, risky behavior, anxiety, panic, social phobia, avoidance, intrusive thoughts, irritability, racing thoughts, impulsive behaviors, pressured speech, paranoid, delusions, or AVH.    MEDICAL ROS    Complete review of systems performed covering Constitutional, Eyes, ENT/Mouth, Cardiovascular, Respiratory, Gastrointestinal, Genitourinary, Musculoskeletal, Skin, Neurologic, Endocrine, Heme/Lymph, and Allergy/Immune.     Complete review of systems was negative with the exception of the following positive symptoms: throat pain, abd pain      ALLERGIES  Patient has no known allergies.      MEDICATIONS    Psychotropics:  denies

## 2020-01-15 NOTE — OP NOTE
DATE: 1/14/20.    PREOPERATIVE DIAGNOSIS:   1.  Intussusception.  2.  Localized peritonitis.    POSTOPERATIVE DIAGNOSIS:   1.  Intussusception.  2.  Localized peritonitis.    PROCEDURE:  Exploratory laparotomy.    ATTENDING SURGEON: Booker Irvin MD.    RESIDENT:  Maggi Barrett MD.    ANESTHESIA:  General.    INDICATION:  Patient is a 24-year-old man who presented with abdominal pain and vomiting.  He was experiencing some opioid withdrawals as he has a history of abuse.  His abdominal pain worsened and he had persistent intussusception on repeat CT scan.  Recommended exploratory laparotomy for evaluation.    PROCEDURE IN DETAIL:  Patient was taken to the operating room and placed supine.  After induction of general endotracheal tube anesthesia, the abdomen was prepped and draped in the standard fashion.  Time-out was performed. Midline incision was made and carried down to the fascia, which was elevated and widely open.  The abdomen was then generously explored.  The small bowel was run from the ligament of Treitz to the cecum multiple times.  No areas of intussusception were identified. All bowel was viable and showed no abnormalities to speak of.  The colon was examined in its entirety and was normal. Visualized liver was normal. The stomach and no abnormalities in NG tube was in appropriate position. There was no signs of abscess or enteric contents present within the abdomen.  Bilateral rectus nerve blocks were performed with Exparel.  Hemostasis was confirmed.  Fascia was closed with running 0 looped PDS and skin was closed with subcuticular 4-0 Monocryl.  Dermabond was applied. Patient was awakened from anesthesia and transferred to the PACU in good condition.  All needle and sponge counts were correct at the conclusion of the case. I was present for the procedure in its entirety.    EBL:  Less than 5 mL.    FINDINGS:  No signs of intussusception or complications of intussusception.  All bowel and  abdominal viscera normal.    COMPLICATIONS:  None.

## 2020-01-15 NOTE — ED NOTES
Report received from KRISTY Bosch. Care assumed. Pt resting comfortably and independently repositioned in stretcher with bed locked in lowest position for safety. NAD and patient states she feels a little better. Respirations even and unlabored and visible chest rise noted. Patient offered bathroom assistance and denies need at this time. Pt instructed to call if assistance is needed.. No needs at this time. Will continue to monitor. Call light within reach. Family at bedside.

## 2020-01-15 NOTE — ANESTHESIA POSTPROCEDURE EVALUATION
Anesthesia Post Evaluation    Patient: Fernando Massey    Procedure(s) Performed: Procedure(s) (LRB):  LAPAROTOMY, EXPLORATORY (N/A)    Final Anesthesia Type: general    Patient location during evaluation: floor  Patient participation: Yes- Able to Participate  Level of consciousness: awake and alert  Post-procedure vital signs: reviewed and stable  Pain management: adequate  Airway patency: patent    PONV status at discharge: No PONV  Anesthetic complications: no      Cardiovascular status: blood pressure returned to baseline  Respiratory status: unassisted  Hydration status: euvolemic  Follow-up not needed.          Vitals Value Taken Time   /78 1/15/2020  3:10 AM   Temp 37.7 °C (99.9 °F) 1/15/2020  3:10 AM   Pulse 80 1/15/2020  3:10 AM   Resp 16 1/15/2020  3:10 AM   SpO2 99 % 1/15/2020  3:10 AM         Event Time     Out of Recovery 01/15/2020 02:15:00          Pain/Zeferino Score: Pain Rating Prior to Med Admin: 0 (1/15/2020  1:24 AM)  Pain Rating Post Med Admin: 0 (1/15/2020  2:15 AM)  Zeferino Score: 10 (1/15/2020  2:15 AM)

## 2020-01-15 NOTE — SUBJECTIVE & OBJECTIVE
Interval History: Doing well this morning.  30 from NG.  700 from chow.    Medications:  Continuous Infusions:   hydromorphone in 0.9 % NaCl 6 mg/30 ml      lactated ringers 125 mL/hr at 01/15/20 0124     Scheduled Meds:   hydromorphone in 0.9 % NaCl 6 mg/30 ml        methocarbamol (ROBAXIN) IVPB  500 mg Intravenous Q8H     PRN Meds:acetaminophen, iohexol, melatonin, naloxone, ondansetron, promethazine (PHENERGAN) IVPB, sodium chloride 0.9%     Review of patient's allergies indicates:  No Known Allergies  Objective:     Vital Signs (Most Recent):  Temp: 98.9 °F (37.2 °C) (01/15/20 0806)  Pulse: 62 (01/15/20 0806)  Resp: 18 (01/15/20 0806)  BP: 116/62 (01/15/20 0806)  SpO2: 97 % (01/15/20 0806) Vital Signs (24h Range):  Temp:  [97.3 °F (36.3 °C)-99.9 °F (37.7 °C)] 98.9 °F (37.2 °C)  Pulse:  [56-80] 62  Resp:  [14-20] 18  SpO2:  [93 %-100 %] 97 %  BP: (110-146)/(56-92) 116/62     Weight: 65 kg (143 lb 4.8 oz)  Body mass index is 19.43 kg/m².    Intake/Output - Last 3 Shifts       01/13 0700 - 01/14 0659 01/14 0700 - 01/15 0659 01/15 0700 - 01/16 0659    I.V. (mL/kg)  1600 (24.6)     IV Piggyback  50     Total Intake(mL/kg)  1650 (25.4)     Urine (mL/kg/hr)  700     Drains  30     Other  200     Total Output  930     Net  +720                  Physical Exam   Constitutional: He is oriented to person, place, and time. He appears well-developed and well-nourished.   HENT:   Head: Normocephalic and atraumatic.   Eyes: Right eye exhibits no discharge. Left eye exhibits no discharge.   Neck: Normal range of motion. Neck supple.   Cardiovascular: Normal rate and regular rhythm.   Pulmonary/Chest: Effort normal. No respiratory distress.   NG in place   Abdominal:   Midline incision c/d/i  Appropriately tender  Non-distended   Musculoskeletal: Normal range of motion.   Neurological: He is alert and oriented to person, place, and time.   Skin: Skin is warm and dry.   Psychiatric: He has a normal mood and affect. His  behavior is normal.   Vitals reviewed.      Significant Labs:  CBC:   Recent Labs   Lab 01/15/20  0424   WBC 17.58*   RBC 4.18*   HGB 13.2*   HCT 39.6*      MCV 95   MCH 31.6*   MCHC 33.3     BMP:   Recent Labs   Lab 01/15/20  0424   *      K 3.7      CO2 23   BUN 7   CREATININE 0.8   CALCIUM 8.9   MG 1.7       Significant Diagnostics:  I have reviewed and interpreted all pertinent imaging results/findings within the past 24 hours.

## 2020-01-15 NOTE — CONSULTS
[facility]  Psychiatry  Consult Note    Patient Name: Fernando Massey  MRN: [id number]   Code Status: Full Code  Admission Date: 1/14/2020  Hospital Length of Stay: 0 days  Attending Physician: Booker Irvin MD  Primary Care Provider: Primary Doctor No     Inpatient consult to Psychiatry  Consult performed by: Cristobal Rivas MD  Consult ordered by: Kaushik Gustafson MD        Subjective:     Principal Problem:Opiate withdrawal    Chief Complaint:  Hx tramadol abuse     HPI:   ADDICTION CONSULT INITIAL EVALUATION     DEPARTMENT:  Psychiatry  SITE: [facility] [address]    DATE OF ADMISSION: 1/14/2020 12:02 PM  LENGTH OF STAY: 0 days    EXAMINING PRACTITIONER: Cristobal Rivas    CONSULT REQUESTED BY: Booker Irvin MD      SUBJECTIVE     CHIEF COMPLAINT  Fernando Massey is a 24 y.o. male who is seen today for an initial psychiatric evaluation by the addiction psychiatry consult service.  Fernando Massey presents with the chief complaint of: tramadol w/d      HISTORY OF PRESENT ILLNESS    Per Primary MD:  Patient is a 24 y.o. male with a past medical history significant for opoid abuse who presented to the ED after acute onset abdominal pain earlier today that woke him from sleep. He describes it as a dull, continuous deep pain which he has never experienced before. The pain was associated with vomiting. He does use tramadol recreationally and was taking roughly 8-9 pills per day over the past several days, last use was 2 days ago. He is still passing flatus and having bowel movements. Upon arrival to the ED he was given zofran, phenergan and bentyl which initially improved his pain, but then it started back again with the vomiting roughly an hour later. CT scan showed small bowel small bowel intussusception. Repeat CT showed persistent intussusception.     clinical concern confounded with narcotic withdrawal s/p negative exploratory laparotomy on 1/14.  Consult addiction  "psych for narcotic hx    Per Addiction Psych MD:  Pt found sleeping in bed.  Rouses easily upon hearing name called, endorses pain but is grossly oriented.  Pt reports hx recreational tramadol use for the past 3-4 months.  Initially pt reports that he takes 6-8 tramadol tabs when he goes to parties, usually just a few days per week.  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  Pt states that the last time he ingested tramadol was on Sunday Jan 12th.  Uses marijuana most days but denies other illicit drug use including PO/IV opiates.  Denies hx tramadol w/d sx.  Denies hx sz.  Denies hx overdose.  Rare EtOH use.  Denies prior psych hx, psych admissions, or hx psychotropics. Pt does not feel that he needs rehab or support to abstain at this time, states that he can stop using his own willpower.  Pt is aware of [facility], states that he will consider going to [facility] or other addiction clinic if his attempt to d/c tramadol is unsuccessful.  Pt has NG tube and says is painful to speak with NG tube in place.    Collateral:  Pt's fathered interviewed separately from pt.  States that pt uses "some kind of pain pill" recreationally.  Pt is fairly open about his drug use, does not appear to make effort to hide it.  Uses a couple of days per week, "mostly when he goes to a party or something."  Pt smokes marijuana but collateral is not concerned about other drug use.  Pt rarely drinks EtOH, "he's not much of a drinker."  Denies knowledge of hx of overdose or prior psych hx.  Pt has held stable job as  and is in a long term relationship with girlfriend.  Feels that it would be in pt's best interest to abstain from pain pills, but does not feel that pt is addicted to them.    SUBSTANCE ABUSE HISTORY  Substance(s) of Choice: marijuana  Substances Used: tramadol, THC  History of IVDU?: denies  Use of Alcohol: rare  Average " Consumption: 1-2 drinks  Last Drink: 2+ weeks ago  Use of Medications for Alcohol/Opioid Use Disorder: denies  History of Complicated Withdrawal: denies  History of Detox: denies  Rehab History: denies  AA/NA involvement: denies  Tobacco: yes  Spouse/Partner Consumption: denies  Patient Aware of Biomedical Complications: somewhat    DSM-5 SUBSTANCE USE DISORDER CRITERIA   Mild (1-3), Moderate (4-5), Severe (?6)  1. Often take in larger amounts or over a longer period of time than was intended.  2. Persistent desire or unsuccessful efforts to cut down or control use.  3. Great deal of time spent in activities necessary to obtain substance, use, or recover from effects.  4. Craving/strong desire for substance or urge to use.  5. Use resulting in failure to fulfill major role obligations at home, work or school.  6. Social, occupational, recreational activities decreased because of use.  7. Continued use despite having persistent or recurrent social or interpersonal problems cause or exaserbated by the substance.  8. Recurrent use in situations in which it is physically hazardous.  9. Use despite physical or psychological problems that are likely to have been caused or exacerbated by the substance.  10. Tolerance, as defined by either of the following.   A. A need for markedly increased amounts of substance to achieve intoxication or desired effect. -OR-    B. A markedly diminished effect with continued use of the same amount of substance.  11. Withdrawal, as manifested by the following.   A. The characteristic withdrawal syndrome for substance. -AND-   B. Substance is taken to relieve or avoid withdrawal symptoms.    ARE THE CRITERIA MET FOR DSM-5 SUBSTANCE USE DISORDER: YES, MILD      PSYCHIATRIC HISTORY  Reported Diagnose(s): denies  Previous Medication Trials: denies  Previous Psychiatric Hospitalizations: denies  Outpatient Psychiatrist: denies      SUICIDE/HOMICIDE RISK ASSESSMENT  Current/active suicidal  ideation/plan/intent: denies  Previous suicide attempts: denies  Current/active homicidal ideation/plan/intent: denies      HISTORY OF TRAUMA, ABUSE & VIOLENCE  Trauma: Pt was in [place] for [place]  Physical Abuse: denies  Sexual Abuse: denies  Violent Conduct: denies    Access to Gun: denies       FAMILY PSYCHIATRIC HISTORY   denies       SOCIAL HISTORY  Marital Status: not   Children: 3   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       PSYCHIATRIC ROS   Neg for changes in sleep, appetite, weight, energy, interest, guilt, concentration, SI/SA, risky behavior, anxiety, panic, social phobia, avoidance, intrusive thoughts, irritability, racing thoughts, impulsive behaviors, pressured speech, paranoid, delusions, or AVH.    MEDICAL ROS    Complete review of systems performed covering Constitutional, Eyes, ENT/Mouth, Cardiovascular, Respiratory, Gastrointestinal, Genitourinary, Musculoskeletal, Skin, Neurologic, Endocrine, Heme/Lymph, and Allergy/Immune.     Complete review of systems was negative with the exception of the following positive symptoms: throat pain, abd pain      ALLERGIES  Patient has no known allergies.      MEDICATIONS    Psychotropics:  denies      Hospital Course: No notes on file         Patient History           Medical as of 1/15/2020    Past Medical History: Patient provided no pertinent medical history.           Surgical as of 1/15/2020    Past Surgical History: Patient provided no pertinent surgical history.           Family as of 1/15/2020    None           Tobacco Use as of 1/15/2020     Smoking Status Smoking Start Date Smoking Quit Date Packs/Day Years Used    Unknown If Ever Smoked -- -- --  --    Types Comments Smokeless Tobacco Status Smokeless Tobacco Quit Date Source    -- -- Unknown -- Provider            Alcohol Use as of 1/15/2020     Alcohol Use Drinks/Week Alcohol/Week Comments Source    Yes -- -- -- Provider    Frequency Standard Drinks Binge Drinking        Never -- --              Drug Use as of 1/15/2020     Drug Use Types Frequency Comments Source    Yes  Marijuana -- -- Provider            Sexual Activity as of 1/15/2020     Sexually Active Birth Control Partners Comments Source    -- -- -- -- Provider            Activities of Daily Living as of 1/15/2020    None           Social Documentation as of 1/15/2020    None           Occupational as of 1/15/2020    None           Socioeconomic as of 1/15/2020     Marital Status Spouse Name Number of Children Years Education Education Level Preferred Language Ethnicity Race Source     -- -- -- -- English /Black Black or  --    Financial Resource Strain Food Insecurity: Worry Food Insecurity: Inability Transportation Needs: Medical Transportation Needs: Non-medical    -- -- -- -- --            Pertinent History     Question Response Comments    Lives with -- --    Place in Birth Order -- --    Lives in -- --    Number of Siblings -- --    Raised by -- --    Legal Involvement -- --    Childhood Trauma -- --    Criminal History of -- --    Financial Status -- --    Highest Level of Education -- --    Does patient have access to a firearm? -- --     Service -- --    Primary Leisure Activity -- --    Spirituality -- --        History reviewed. No pertinent past medical history.  History reviewed. No pertinent surgical history.  Family History     None        Tobacco Use    Smoking status: Unknown If Ever Smoked   Substance and Sexual Activity    Alcohol use: Yes     Frequency: Never    Drug use: Yes     Types: Marijuana    Sexual activity: Not on file     Review of patient's allergies indicates:  No  Known Allergies    No current facility-administered medications on file prior to encounter.      No current outpatient medications on file prior to encounter.     Psychotherapeutics (From admission, onward)    None        Review of Systems   Constitutional: Negative for chills and diaphoresis.   HENT: Positive for sore throat.    Gastrointestinal: Positive for abdominal pain. Negative for diarrhea.   Musculoskeletal: Negative for arthralgias and back pain.   Neurological: Negative for tremors.     Strengths and Liabilities: Strength: Patient is expressive/articulate., Strength: Patient has positive support network., Liability: Patient is defensive., Liability: Patient lacks coping skills.    Objective:     Vital Signs (Most Recent):  Temp: 98.1 °F (36.7 °C) (01/15/20 1130)  Pulse: (!) 56 (01/15/20 1130)  Resp: 18 (01/15/20 1130)  BP: 128/65 (01/15/20 1130)  SpO2: 99 % (01/15/20 1130) Vital Signs (24h Range):  Temp:  [97.3 °F (36.3 °C)-99.9 °F (37.7 °C)] 98.1 °F (36.7 °C)  Pulse:  [56-80] 56  Resp:  [16-19] 18  SpO2:  [93 %-100 %] 99 %  BP: (110-146)/(56-80) 128/65     Height: 6' (182.9 cm)  Weight: 65 kg (143 lb 4.8 oz)  Body mass index is 19.43 kg/m².      Intake/Output Summary (Last 24 hours) at 1/15/2020 1518  Last data filed at 1/15/2020 1400  Gross per 24 hour   Intake 2250 ml   Output 1530 ml   Net 720 ml       Physical Exam   Psychiatric:   PAIN   8/10    CONSTITUTIONAL  Appearance: unremarkable, age appropriate    MUSCULOSKELETAL  Abnormal Involuntary Movements: n/a  Gait and Station: wnl    PSYCHIATRIC   Appearance: Mild distress 2/2 pain from NG tube, age appropriate, appropriately dressed and groomed  Behavior: Calm, cooperative  Orientation:  Self, place, situation, year  Speech: normal volume, rate, and tone  Language:  Fluent english  Mood: neutral  Affect: Normal and mood-congruent  Thought Process: Linear and well-organized  Associations:  Logical and intact  Memory:  Recent and remote  intact  Attention/Concentration:  Grossly intact  Fund of Knowledge:  Appropriate for level of education  Thought Content: No SI, HI, delusions, or paranoia  Perceptions:  Pt denies AVH and no objective s/s of AVH  Cognition: Intact, follows commands, appropriate contributions to interview  Insight: Fair  Judgment: Appropriate for setting          Significant Labs:   Last 24 Hours:   Recent Lab Results       01/15/20  0424   01/14/20  1727        Albumin 4.1       Alkaline Phosphatase 83       ALT 11       Anion Gap 10       Appearance, UA   Clear     AST 23       Bacteria, UA   Occasional     Baso # 0.02       Basophil% 0.1       Bilirubin (UA)   Negative     BILIRUBIN TOTAL 0.9  Comment:  For infants and newborns, interpretation of results should be based  on gestational age, weight and in agreement with clinical  observations.  Premature Infant recommended reference ranges:  Up to 24 hours.............<8.0 mg/dL  Up to 48 hours............<12.0 mg/dL  3-5 days..................<15.0 mg/dL  6-29 days.................<15.0 mg/dL         BUN, Bld 7       Calcium 8.9       Chloride 105       CO2 23       Color, UA   Yellow     Creatinine 0.8       Differential Method Automated       eGFR if  >60.0       eGFR if non  >60.0  Comment:  Calculation used to obtain the estimated glomerular filtration  rate (eGFR) is the CKD-EPI equation.          Eos # 0.0       Eosinophil% 0.0       Glucose 143       Glucose, UA   Negative     Gran # (ANC) 16.1       Gran% 91.9       Hematocrit 39.6       Hemoglobin 13.2       Hyaline Casts, UA   1     Immature Grans (Abs) 0.09  Comment:  Mild elevation in immature granulocytes is non specific and   can be seen in a variety of conditions including stress response,   acute inflammation, trauma and pregnancy. Correlation with other   laboratory and clinical findings is essential.         Immature Granulocytes 0.5       Coumadin Monitoring INR  1.1  Comment:  Coumadin Therapy:  2.0 - 3.0 for INR for all indicators except mechanical heart valves  and antiphospholipid syndromes which should use 2.5 - 3.5.         Ketones, UA   Negative     Leukocytes, UA   Negative     Lymph # 0.8       Lymph% 4.7       Magnesium 1.7       MCH 31.6       MCHC 33.3       MCV 95       Microscopic Comment   SEE COMMENT  Comment:  Other formed elements not mentioned in the report are not   present in the microscopic examination.        Mono # 0.5       Mono% 2.8       MPV 12.1       NITRITE UA   Negative     Non-Squam Epith   0     nRBC 0       Occult Blood UA   Negative     pH, UA   7.0     Phosphorus 3.0       Platelet Estimate Appears normal       Platelets 173       Potassium 3.7       PROTEIN TOTAL 6.9       Protein, UA   1+  Comment:  Recommend a 24 hour urine protein or a urine   protein/creatinine ratio if globulin induced proteinuria is  clinically suspected.       Protime 10.9       RBC 4.18       RBC, UA   2     RDW 13.8       Sodium 138       Specific Gravity, UA   >1.030     Specimen UA   Urine, Clean Catch     Squam Epithel, UA   0     WBC Clumps, UA   None     WBC, UA   2     WBC 17.58       Yeast, UA   None           Significant Imaging: I have reviewed all pertinent imaging results/findings within the past 24 hours.    Assessment/Plan:     Marijuana abuse  See below.    Mild tramadol use disorder  ASSESSMENT:      DIAGNOSES & PROBLEMS     25yo M hx tramadol and marijuana abuse and PMH notable for recent SBO.  Pt endorses intermittent recreational tramadol use per HPI.  Denies hx sz or w/d sx.  Denies other illicit drug use and only rare EtOH.  Low motivation for pursuing tx, states intention to abstain relying primarily on his own willpower as primary abstinence method.  Pt became mildly defensive when potential role tramadol played in SBO was broached.  No immediate safety concerns from psych perspective outside of aforementioned substance abuse.     Tramadol  Use Disorder, Mild  Marijuana Use Disorder, Unspecified Severity        STRENGTHS AND LIABILITIES   Strength: Patient is expressive/articulate., Strength: Patient has positive support network., Liability: Patient is defensive., Liability: Patient lacks coping skills.     MOTIVATION TO PURSUE RECOVERY: virtually non-existent     ACCEPTANCE OF ADDICTION: limited     ABILITY TO ADHERE TO TREATMENT PLAN: moderate        PLAN:         MANAGEMENT PLAN, TREATMENT GOALS, THERAPEUTIC TECHNIQUES/APPROACHES & CLINICAL REASONING     Tramadol Use Disorder, Mild  Marijuana Use Disorder, Unspecified Severity     · Patient counseled on abstinence from substances of abuse (illicit and prescription).  · Relapse prevention and motivational interviewing provided.  · Education provided on 12 step recovery programs.  · Pt states intention to abstain from tramadol and states that he will seek assistance at [facility] or other addiction clinic or rehab should his efforts fail  · Pt declined offer for assistance with rehab placement at this time  · Recommend treatment as usual for pain sx   Would be reasonable to offer comfort meds (Hydroxyzine for rhinorrhea, Zofran for nausea, Bentyl for cramps, Meloxicam for pain, Imodium for diarrhea, Zanaflex for spasms) if pt were to endorse sx of opiate w/d        PRESCRIPTION DRUG MANAGEMENT  - The risks and benefits of medication were discussed with this patient.  - Possible expectable adverse effects of any current or proposed individual psychotropic agents were discussed with this patient.  - Counseling was provided on the importance of full compliance with medication regimens.        In cases of emergency, daily coverage provided by Acute/ER Psych MD, NP, or SW, with contact numbers located in [facility] On Call Schedule     Case discussed with staff addiction psychiatrist: NAZIA ARCHER MD       Thank you for allowing me to provide to the care of this patient.    Cristobal Rivas,  MD [facility], PGY-2  Pager Number [phone]  [facility]

## 2020-01-15 NOTE — ASSESSMENT & PLAN NOTE
ASSESSMENT:      DIAGNOSES & PROBLEMS     23yo M hx tramadol and marijuana abuse and PMH notable for recent SBO.  Pt endorses intermittent recreational tramadol use per HPI.  Denies hx sz or w/d sx.  Denies other illicit drug use and only rare EtOH.  Low motivation for pursuing tx, states intention to abstain relying primarily on his own willpower as primary abstinence method.  Pt became mildly defensive when potential role tramadol played in SBO was broached.  No immediate safety concerns from psych perspective outside of aforementioned substance abuse.     Tramadol Use Disorder, Mild  Marijuana Use Disorder, Unspecified Severity        STRENGTHS AND LIABILITIES   Strength: Patient is expressive/articulate., Strength: Patient has positive support network., Liability: Patient is defensive., Liability: Patient lacks coping skills.     MOTIVATION TO PURSUE RECOVERY: virtually non-existent     ACCEPTANCE OF ADDICTION: limited     ABILITY TO ADHERE TO TREATMENT PLAN: moderate        PLAN:         MANAGEMENT PLAN, TREATMENT GOALS, THERAPEUTIC TECHNIQUES/APPROACHES & CLINICAL REASONING     Tramadol Use Disorder, Mild  Marijuana Use Disorder, Unspecified Severity     · Patient counseled on abstinence from substances of abuse (illicit and prescription).  · Relapse prevention and motivational interviewing provided.  · Education provided on 12 step recovery programs.  · Pt states intention to abstain from tramadol and states that he will seek assistance at [facility] or other addiction clinic or rehab should his efforts fail  · Pt declined offer for assistance with rehab placement at this time  · Recommend treatment as usual for pain sx   Would be reasonable to offer comfort meds (Hydroxyzine for rhinorrhea, Zofran for nausea, Bentyl for cramps, Meloxicam for pain, Imodium for diarrhea, Zanaflex for spasms) if pt were to endorse sx of opiate w/d        PRESCRIPTION DRUG MANAGEMENT  - The risks and benefits of medication were  discussed with this patient.  - Possible expectable adverse effects of any current or proposed individual psychotropic agents were discussed with this patient.  - Counseling was provided on the importance of full compliance with medication regimens.        In cases of emergency, daily coverage provided by Acute/ER Psych MD, NP, or BECCA, with contact numbers located in [facility] On Call Schedule     Case discussed with staff addiction psychiatrist: NAZIA ARCHER MD

## 2020-01-15 NOTE — ANESTHESIA PREPROCEDURE EVALUATION
[facility]  Anesthesia Pre-Operative Evaluation         Patient Name: Fernando Massey  YOB: 1995  MRN: [id number]    SUBJECTIVE:     Pre-operative evaluation for Procedure(s) (LRB):  LAPAROTOMY, EXPLORATORY (N/A)     01/14/2020    Fernando Massey is a 24 y.o. male current smoker w/ no significant PMHx who presented with a 1-day hx of nausea/vomiting.  He was found to have intussusception.    NPO since yesterday night with exception of oral contrast for imaging approximately 1 hour ago.    Patient now presents for the above procedure(s).      LDA:        Peripheral IV - Single Lumen 01/14/20 2046 18 G Right Antecubital (Active)   Site Assessment Clean;Dry;Intact 1/14/2020  8:46 PM   Line Status Blood return noted;Flushed;Saline locked 1/14/2020  8:46 PM   Dressing Status Clean;Dry;Intact 1/14/2020  8:46 PM   Number of days: 0       Prev airway: None documented.    Drips:    lactated ringers         Patient Active Problem List   Diagnosis    Opiate withdrawal       Review of patient's allergies indicates:  No Known Allergies    Current Inpatient Medications:      No current facility-administered medications on file prior to encounter.      No current outpatient medications on file prior to encounter.       History reviewed. No pertinent surgical history.    Social History     Socioeconomic History    Marital status:      Spouse name: Not on file    Number of children: Not on file    Years of education: Not on file    Highest education level: Not on file   Occupational History    Not on file   Social Needs    Financial resource strain: Not on file    Food insecurity:     Worry: Not on file     Inability: Not on file    Transportation needs:     Medical: Not on file     Non-medical: Not on file   Tobacco Use    Smoking status: Unknown If Ever Smoked   Substance and Sexual Activity    Alcohol use: Yes     Frequency: Never    Drug use: Yes     Types: Marijuana    Sexual  activity: Not on file   Lifestyle    Physical activity:     Days per week: Not on file     Minutes per session: Not on file    Stress: Not on file   Relationships    Social connections:     Talks on phone: Not on file     Gets together: Not on file     Attends Gnosticism service: Not on file     Active member of club or organization: Not on file     Attends meetings of clubs or organizations: Not on file     Relationship status: Not on file   Other Topics Concern    Not on file   Social History Narrative    Not on file       OBJECTIVE:     Vital Signs Range (Last 24H):  Temp:  [37 °C (98.6 °F)]   Pulse:  [56-78]   Resp:  [14-20]   BP: (110-144)/(56-92)   SpO2:  [97 %-100 %]       Significant Labs:  Lab Results   Component Value Date    WBC 9.72 01/14/2020    HGB 14.1 01/14/2020    HCT 43.2 01/14/2020     01/14/2020    ALT 15 01/14/2020    AST 24 01/14/2020     01/14/2020    K 4.3 01/14/2020     01/14/2020    CREATININE 0.8 01/14/2020    BUN 10 01/14/2020    CO2 22 (L) 01/14/2020       Diagnostic Studies:   EXAMINATION:  CT ABDOMEN PELVIS WITHOUT CONTRAST    CLINICAL HISTORY:  oral contrast to evaluate for persistent intussusception, surgeon request;    TECHNIQUE:  Low dose axial images, sagittal and coronal reformations were obtained from the lung bases to the pubic symphysis.  30 mL of oral Omnipaque 350 was administered.    COMPARISON:  January 14, 2020 at 15:17 hours.    FINDINGS:  Heart: Normal in size. No pericardial effusion.    Lung Bases: Well aerated, without consolidation or pleural fluid.    Liver: Normal in size and attenuation, with no focal hepatic lesions.    Gallbladder: Vicarious excretion of contrast in the gallbladder.    Bile Ducts: No evidence of dilated ducts.    Pancreas: No mass or peripancreatic fat stranding.    Spleen: Unremarkable.    Adrenals: Unremarkable.    Kidneys/ Ureters: Unremarkable.    Bladder: No evidence of wall thickening.    Reproductive organs:  Unremarkable.    GI Tract/Mesentery: Previously noted small bowel into small bowel jejunal intussusception in the left upper quadrant appears unchanged.  Additional new area of small-bowel into small bowel jejunal intussusception is noted in the upper mid abdomen just below the gastric antrum.  No bowel obstruction is identified with either of these jejunal intussusceptions with contrast seen extending throughout the small bowel to the distal ileum.    Peritoneal Space: No ascites. No free air.    Retroperitoneum: No significant adenopathy.    Abdominal wall: Unremarkable.    Vasculature: No significant atherosclerosis or aneurysm.    Bones: No acute fracture.      Impression       Previously noted small bowel into small bowel jejunal intussusception in the left upper quadrant appears unchanged.  Additional new area of small-bowel into small bowel jejunal intussusception is noted in the upper mid abdomen just below the gastric antrum. No bowel obstruction is identified with either of these jejunal intussusceptions with contrast seen extending throughout the small bowel to the distal ileum.      Electronically signed by: Sergio Rai MD  Date: 01/14/2020  Time: 21:52         EKG:   No results found for this or any previous visit.    2D ECHO:  TTE:  No results found for this or any previous visit.    DENNIS:  No results found for this or any previous visit.    ASSESSMENT/PLAN:                                                                                                                  01/14/2020  Fernando Massey is a 24 y.o., male.    Anesthesia Evaluation    I have reviewed the Patient Summary Reports.    I have reviewed the Nursing Notes.      Review of Systems  Anesthesia Hx:  No previous Anesthesia    Social:  Smoker    Hematology/Oncology:  Hematology Normal   Oncology Normal    -- Denies Anemia:    EENT/Dental:EENT/Dental Normal   Cardiovascular:  Cardiovascular Normal  Denies Hypertension.      Pulmonary:  Pulmonary Normal    Renal/:  Renal/ Normal  Denies Chronic Renal Disease.     Hepatic/GI:  Bowel Conditions:    Musculoskeletal:  Musculoskeletal Normal    Neurological:  Neurology Normal  Denies CVA. Denies Seizures.    Endocrine:  Endocrine Normal Denies Diabetes.        Physical Exam  General:  Well nourished    Airway/Jaw/Neck:  Airway Findings: Mouth Opening: Normal Tongue: Normal, Piercing  General Airway Assessment: Adult  Mallampati: I  TM Distance: Normal, at least 6 cm     Eyes/Ears/Nose:  EYES/EARS/NOSE FINDINGS: Normal   Dental:  Dental Findings: In tact   Chest/Lungs:  Chest/Lungs Findings: Clear to auscultation, Normal Respiratory Rate     Heart/Vascular:  Heart Findings: Rate: Normal  Rhythm: Regular Rhythm  Sounds: Normal        Mental Status:  Mental Status Findings:  Cooperative, Alert and Oriented         Anesthesia Plan  Type of Anesthesia, risks & benefits discussed:  Anesthesia Type:  general  Patient's Preference:   Intra-op Monitoring Plan: standard ASA monitors  Intra-op Monitoring Plan Comments:   Post Op Pain Control Plan: IV/PO Opioids PRN, multimodal analgesia and per primary service following discharge from PACU  Post Op Pain Control Plan Comments:   Induction:   IV  Beta Blocker:  Patient is not currently on a Beta-Blocker (No further documentation required).       Informed Consent: Patient understands risks and agrees with Anesthesia plan.  Questions answered. Anesthesia consent signed with patient.  ASA Score: 2  emergent   Day of Surgery Review of History & Physical:    H&P update referred to the surgeon.         Ready For Surgery From Anesthesia Perspective.

## 2020-01-15 NOTE — OP NOTE
DATE OF PROCEDURE: 1/15/2020    PREOPERATIVE DIAGNOSIS:  Small bowel intussusception       POSTOPERATIVE DIAGNOSIS:  Small bowel intussusception    PROCEDURES PERFORMED:  1.  Exploratory laparotomy    ATTENDING SURGEON:  Booker Irvin MD    RESIDENT: Maggi Barrett MD    ANESTHESIA:  General    KEY FINDINGS:  No significant findings.  We did not find a cause of the intussusception that was noted on CT scan.  The bowel was run from ligament Treitz to terminal ileum multiple times.    ESTIMATED BLOOD LOSS:  5 ml    DRAINS:  None    COMPLICATIONS:  None    INDICATIONS FOR PROCEDURE: The patient is a 24 y.o. male who presented to the emergency room with a several hour history of abdominal pain nausea and vomiting.  CT scan showed findings of a small bowel small bowel intussusception.  We repeated the CT scan which showed persistent findings of said small bowel small bowel intussusception.  Therefore surgery was indicated.      PROCEDURE IN DETAIL: After informed consent was obtained, the patient was brought to the Operative Theater and placed in in the supine position.  General endotracheal anesthesia was administered.  The patient was then prepped and draped in the typical sterile fashion.  A time-out was called prior to the procedure confirming this was the correct patient correct procedure to be performed. A mini-laparotomy incision was made in the patient's midline.  Dissection was carried out Bovie electric cautery.  The abdomen was entered.  We then were able to run the small bowel from the ligament of Treitz to the terminal ileum.  We did not find any significant pathological findings during this examination.  There were no masses in all the bowel appeared grossly normal. We did not find any evidence of intussusception.  We did run the bowel several times to confirm that there was no pathology. Once we were satisfied performed a rectus block with Marcaine.  We then closed the fascia with 2 PDS is.  The skin  was closed with a running 4 Monocryl suture. Dermabond was applied in the OR.  The patient tolerated the procedure well and without issue.  He was delivered to PACU in stable condition..

## 2020-01-15 NOTE — ANESTHESIA PROCEDURE NOTES
Intubation  Performed by: Imelda Hare CRNA  Authorized by: Imelda Hare CRNA     Intubation:     Induction:  Intravenous    Intubated:  Postinduction    Mask Ventilation:  N/a    Attempts:  1    Attempted By:  CRNA    Method of Intubation:  Direct    Blade:  Greene 2    Laryngeal View Grade: Grade IIA - cords partially seen      Difficult Airway Encountered?: No      Complications:  None    Airway Device:  Oral endotracheal tube    Airway Device Size:  7.5    Tube secured:  25    Secured at:  The lips    Placement Verified By:  Capnometry and Revisualization with laryngoscopy    Complicating Factors:  Anterior larynx    Findings Post-Intubation:  BS equal bilateral and atraumatic/condition of teeth unchanged

## 2020-01-15 NOTE — ASSESSMENT & PLAN NOTE
Fernando Massey is a 24 y.o. male w/ concern for intussusception on imaging and clinical concern confounded with narcotic withdrawal s/p negative exploratory laparotomy on 1/14.    -d/c chow  -d/c NG  -PCA  -OOBTC  -decrease IVF  -clears    Dispo: Pending ROBF and discontinuation of PCA

## 2020-01-15 NOTE — PROGRESS NOTES
[facility]  General Surgery  Progress Note    Subjective:     History of Present Illness:  No notes on file    Post-Op Info:  Procedure(s) (LRB):  LAPAROTOMY, EXPLORATORY (N/A)   1 Day Post-Op     Interval History: Doing well this morning.  30 from NG.  700 from chow.    Medications:  Continuous Infusions:   hydromorphone in 0.9 % NaCl 6 mg/30 ml      lactated ringers 125 mL/hr at 01/15/20 0124     Scheduled Meds:   hydromorphone in 0.9 % NaCl 6 mg/30 ml        methocarbamol (ROBAXIN) IVPB  500 mg Intravenous Q8H     PRN Meds:acetaminophen, iohexol, melatonin, naloxone, ondansetron, promethazine (PHENERGAN) IVPB, sodium chloride 0.9%     Review of patient's allergies indicates:  No Known Allergies  Objective:     Vital Signs (Most Recent):  Temp: 98.9 °F (37.2 °C) (01/15/20 0806)  Pulse: 62 (01/15/20 0806)  Resp: 18 (01/15/20 0806)  BP: 116/62 (01/15/20 0806)  SpO2: 97 % (01/15/20 0806) Vital Signs (24h Range):  Temp:  [97.3 °F (36.3 °C)-99.9 °F (37.7 °C)] 98.9 °F (37.2 °C)  Pulse:  [56-80] 62  Resp:  [14-20] 18  SpO2:  [93 %-100 %] 97 %  BP: (110-146)/(56-92) 116/62     Weight: 65 kg (143 lb 4.8 oz)  Body mass index is 19.43 kg/m².    Intake/Output - Last 3 Shifts       01/13 0700 - 01/14 0659 01/14 0700 - 01/15 0659 01/15 0700 - 01/16 0659    I.V. (mL/kg)  1600 (24.6)     IV Piggyback  50     Total Intake(mL/kg)  1650 (25.4)     Urine (mL/kg/hr)  700     Drains  30     Other  200     Total Output  930     Net  +720                  Physical Exam   Constitutional: He is oriented to person, place, and time. He appears well-developed and well-nourished.   HENT:   Head: Normocephalic and atraumatic.   Eyes: Right eye exhibits no discharge. Left eye exhibits no discharge.   Neck: Normal range of motion. Neck supple.   Cardiovascular: Normal rate and regular rhythm.   Pulmonary/Chest: Effort normal. No respiratory distress.   NG in place   Abdominal:   Midline incision c/d/i  Appropriately  tender  Non-distended   Musculoskeletal: Normal range of motion.   Neurological: He is alert and oriented to person, place, and time.   Skin: Skin is warm and dry.   Psychiatric: He has a normal mood and affect. His behavior is normal.   Vitals reviewed.      Significant Labs:  CBC:   Recent Labs   Lab 01/15/20  0424   WBC 17.58*   RBC 4.18*   HGB 13.2*   HCT 39.6*      MCV 95   MCH 31.6*   MCHC 33.3     BMP:   Recent Labs   Lab 01/15/20  0424   *      K 3.7      CO2 23   BUN 7   CREATININE 0.8   CALCIUM 8.9   MG 1.7       Significant Diagnostics:  I have reviewed and interpreted all pertinent imaging results/findings within the past 24 hours.    Assessment/Plan:     Intussusception  Fernando Massey is a 24 y.o. male w/ concern for intussusception on imaging and clinical concern confounded with narcotic withdrawal s/p negative exploratory laparotomy on 1/14.    -d/c chow  -d/c NG  -PCA  -OOBTC  -decrease IVF  -clears  -consult addiction psych for narcotic hx    Dispo: Pending ROBF and discontinuation of PCA        Kaushik Gustafson MD  General Surgery  [facility]

## 2020-01-15 NOTE — H&P
[facility]  General Surgery  History & Physical    Patient Name: Fernando Massey  MRN: [id number]  Admission Date: 1/14/2020  Attending Physician: Rodney Simpson MD   Primary Care Provider: Primary Doctor No    Patient information was obtained from patient, past medical records and ER records.     Subjective:     Chief Complaint/Reason for Admission: Abdominal pain    History of Present Illness:  Patient is a 24 y.o. male with a past medical history significant for opoid abuse who presented to the ED after acute onset abdominal pain earlier today that woke him from sleep. He describes it as a dull, continuous deep pain which he has never experienced before. The pain was associated with vomiting. He does use tramadol recreationally and was taking roughly 8-9 pills per day over the past several days, last use was 2 days ago. He is still passing flatus and having bowel movements. Upon arrival to the ED he was given zofran, phenergan and bentyl which initially improved his pain, but then it started back again with the vomiting roughly an hour later. CT scan showed small bowel small bowel intussusception. Repeat CT showed persistent intussusception.     No current facility-administered medications on file prior to encounter.      No current outpatient medications on file prior to encounter.       Review of patient's allergies indicates:  No Known Allergies    History reviewed. No pertinent past medical history.  History reviewed. No pertinent surgical history.  Family History     None        Tobacco Use    Smoking status: Unknown If Ever Smoked   Substance and Sexual Activity    Alcohol use: Yes     Frequency: Never    Drug use: Yes     Types: Marijuana    Sexual activity: Not on file     Review of Systems   Constitutional: Positive for activity change and appetite change.   Respiratory: Negative for cough and shortness of breath.    Cardiovascular: Negative for chest pain and palpitations.    Gastrointestinal: Positive for abdominal pain, nausea and vomiting. Negative for blood in stool and constipation.   Endocrine: Negative for cold intolerance and heat intolerance.   Musculoskeletal: Negative for arthralgias and myalgias.     Objective:     Vital Signs (Most Recent):  Temp: 98.6 °F (37 °C) (01/14/20 1122)  Pulse: 76 (01/14/20 1854)  Resp: 19 (01/14/20 1507)  BP: 112/60 (01/14/20 1854)  SpO2: 98 % (01/14/20 1854) Vital Signs (24h Range):  Temp:  [98.6 °F (37 °C)] 98.6 °F (37 °C)  Pulse:  [56-78] 76  Resp:  [14-20] 19  SpO2:  [97 %-100 %] 98 %  BP: (110-144)/(56-92) 112/60        There is no height or weight on file to calculate BMI.    Physical Exam   Constitutional: He is oriented to person, place, and time. He appears well-developed and well-nourished. No distress.   Cardiovascular: Normal rate and regular rhythm.   Pulmonary/Chest: Effort normal. No respiratory distress.   Abdominal: Soft. He exhibits no distension. There is tenderness (Mildly tender to palpation diffusely, states he feels it most in his left lower quadrant). There is no rebound and no guarding.   Neurological: He is alert and oriented to person, place, and time.   Skin: Skin is warm and dry.       Significant Labs:  CBC:   Recent Labs   Lab 01/14/20  1247   WBC 9.72   RBC 4.49*   HGB 14.1   HCT 43.2      MCV 96   MCH 31.4*   MCHC 32.6     CMP:   Recent Labs   Lab 01/14/20  1247   *   CALCIUM 9.4   ALBUMIN 4.7   PROT 7.9      K 4.3   CO2 22*      BUN 10   CREATININE 0.8   ALKPHOS 98   ALT 15   AST 24   BILITOT 0.8       Significant Diagnostics:  CT:  EXAMINATION:  CT ABDOMEN PELVIS WITHOUT CONTRAST    CLINICAL HISTORY:  oral contrast to evaluate for persistent intussusception, surgeon request;    TECHNIQUE:  Low dose axial images, sagittal and coronal reformations were obtained from the lung bases to the pubic symphysis.  30 mL of oral Omnipaque 350 was administered.    COMPARISON:  January 14, 2020 at  15:17 hours.    FINDINGS:  Heart: Normal in size. No pericardial effusion.    Lung Bases: Well aerated, without consolidation or pleural fluid.    Liver: Normal in size and attenuation, with no focal hepatic lesions.    Gallbladder: Vicarious excretion of contrast in the gallbladder.    Bile Ducts: No evidence of dilated ducts.    Pancreas: No mass or peripancreatic fat stranding.    Spleen: Unremarkable.    Adrenals: Unremarkable.    Kidneys/ Ureters: Unremarkable.    Bladder: No evidence of wall thickening.    Reproductive organs: Unremarkable.    GI Tract/Mesentery: Previously noted small bowel into small bowel jejunal intussusception in the left upper quadrant appears unchanged.  Additional new area of small-bowel into small bowel jejunal intussusception is noted in the upper mid abdomen just below the gastric antrum.  No bowel obstruction is identified with either of these jejunal intussusceptions with contrast seen extending throughout the small bowel to the distal ileum.    Peritoneal Space: No ascites. No free air.    Retroperitoneum: No significant adenopathy.    Abdominal wall: Unremarkable.    Vasculature: No significant atherosclerosis or aneurysm.    Bones: No acute fracture.      Impression       Previously noted small bowel into small bowel jejunal intussusception in the left upper quadrant appears unchanged.  Additional new area of small-bowel into small bowel jejunal intussusception is noted in the upper mid abdomen just below the gastric antrum. No bowel obstruction is identified with either of these jejunal intussusceptions with contrast seen extending throughout the small bowel to the distal ileum.      Electronically signed by: Sergio Ria MD  Date: 01/14/2020  Time: 21:52         Assessment/Plan:     Active Diagnoses:    Diagnosis Date Noted POA    Opiate withdrawal [F11.23] 01/14/2020 Yes      Problems Resolved During this Admission:     VTE Risk Mitigation (From admission, onward)          Ordered     Place LYRIC hose  Until discontinued      01/14/20 2221     Place sequential compression device  Until discontinued      01/14/20 2221     IP VTE LOW RISK PATIENT  Once      01/14/20 2221            23 yo male with intussusception    Plan:  - Admit to general surgery  - NPO  - Will place NG tube   - MIVF  - IV pain control  - To OR tonight for ex lap  - discussed with Dr. Albaro Barrett MD  General Surgery  [facility]

## 2020-01-15 NOTE — SUBJECTIVE & OBJECTIVE
Patient History           Medical as of 1/15/2020    Past Medical History: Patient provided no pertinent medical history.           Surgical as of 1/15/2020    Past Surgical History: Patient provided no pertinent surgical history.           Family as of 1/15/2020    None           Tobacco Use as of 1/15/2020     Smoking Status Smoking Start Date Smoking Quit Date Packs/Day Years Used    Unknown If Ever Smoked -- -- -- --    Types Comments Smokeless Tobacco Status Smokeless Tobacco Quit Date Source    -- -- Unknown -- Provider            Alcohol Use as of 1/15/2020     Alcohol Use Drinks/Week Alcohol/Week Comments Source    Yes -- -- -- Provider    Frequency Standard Drinks Binge Drinking        Never -- --              Drug Use as of 1/15/2020     Drug Use Types Frequency Comments Source    Yes  Marijuana -- -- Provider            Sexual Activity as of 1/15/2020     Sexually Active Birth Control Partners Comments Source    -- -- -- -- Provider            Activities of Daily Living as of 1/15/2020    None           Social Documentation as of 1/15/2020    None           Occupational as of 1/15/2020    None           Socioeconomic as of 1/15/2020     Marital Status Spouse Name Number of Children Years Education Education Level Preferred Language Ethnicity Race Source     -- -- -- -- English /Black Black or  --    Financial Resource Strain Food Insecurity: Worry Food Insecurity: Inability Transportation Needs: Medical Transportation Needs: Non-medical    -- -- -- -- --            Pertinent History     Question Response Comments    Lives with -- --    Place in Birth Order -- --    Lives in -- --    Number of Siblings -- --    Raised by -- --    Legal Involvement -- --    Childhood Trauma -- --    Criminal History of -- --    Financial Status -- --    Highest Level of Education -- --    Does patient have access to a firearm? -- --     Service -- --    Primary Leisure  Activity -- --    Spirituality -- --        History reviewed. No pertinent past medical history.  History reviewed. No pertinent surgical history.  Family History     None        Tobacco Use    Smoking status: Unknown If Ever Smoked   Substance and Sexual Activity    Alcohol use: Yes     Frequency: Never    Drug use: Yes     Types: Marijuana    Sexual activity: Not on file     Review of patient's allergies indicates:  No Known Allergies    No current facility-administered medications on file prior to encounter.      No current outpatient medications on file prior to encounter.     Psychotherapeutics (From admission, onward)    None        Review of Systems   Constitutional: Negative for chills and diaphoresis.   HENT: Positive for sore throat.    Gastrointestinal: Positive for abdominal pain. Negative for diarrhea.   Musculoskeletal: Negative for arthralgias and back pain.   Neurological: Negative for tremors.     Strengths and Liabilities: Strength: Patient is expressive/articulate., Strength: Patient has positive support network., Liability: Patient is defensive., Liability: Patient lacks coping skills.    Objective:     Vital Signs (Most Recent):  Temp: 98.1 °F (36.7 °C) (01/15/20 1130)  Pulse: (!) 56 (01/15/20 1130)  Resp: 18 (01/15/20 1130)  BP: 128/65 (01/15/20 1130)  SpO2: 99 % (01/15/20 1130) Vital Signs (24h Range):  Temp:  [97.3 °F (36.3 °C)-99.9 °F (37.7 °C)] 98.1 °F (36.7 °C)  Pulse:  [56-80] 56  Resp:  [16-19] 18  SpO2:  [93 %-100 %] 99 %  BP: (110-146)/(56-80) 128/65     Height: 6' (182.9 cm)  Weight: 65 kg (143 lb 4.8 oz)  Body mass index is 19.43 kg/m².      Intake/Output Summary (Last 24 hours) at 1/15/2020 1518  Last data filed at 1/15/2020 1400  Gross per 24 hour   Intake 2250 ml   Output 1530 ml   Net 720 ml       Physical Exam   Psychiatric:   PAIN   8/10    CONSTITUTIONAL  Appearance: unremarkable, age appropriate    MUSCULOSKELETAL  Abnormal Involuntary Movements: n/a  Gait and Station:  wnl    PSYCHIATRIC   Appearance: Mild distress 2/2 pain from NG tube, age appropriate, appropriately dressed and groomed  Behavior: Calm, cooperative  Orientation:  Self, place, situation, year  Speech: normal volume, rate, and tone  Language:  Fluent english  Mood: neutral  Affect: Normal and mood-congruent  Thought Process: Linear and well-organized  Associations:  Logical and intact  Memory:  Recent and remote intact  Attention/Concentration:  Grossly intact  Fund of Knowledge:  Appropriate for level of education  Thought Content: No SI, HI, delusions, or paranoia  Perceptions:  Pt denies AVH and no objective s/s of AVH  Cognition: Intact, follows commands, appropriate contributions to interview  Insight: Fair  Judgment: Appropriate for setting          Significant Labs:   Last 24 Hours:   Recent Lab Results       01/15/20  0424   01/14/20  1727        Albumin 4.1       Alkaline Phosphatase 83       ALT 11       Anion Gap 10       Appearance, UA   Clear     AST 23       Bacteria, UA   Occasional     Baso # 0.02       Basophil% 0.1       Bilirubin (UA)   Negative     BILIRUBIN TOTAL 0.9  Comment:  For infants and newborns, interpretation of results should be based  on gestational age, weight and in agreement with clinical  observations.  Premature Infant recommended reference ranges:  Up to 24 hours.............<8.0 mg/dL  Up to 48 hours............<12.0 mg/dL  3-5 days..................<15.0 mg/dL  6-29 days.................<15.0 mg/dL         BUN, Bld 7       Calcium 8.9       Chloride 105       CO2 23       Color, UA   Yellow     Creatinine 0.8       Differential Method Automated       eGFR if  >60.0       eGFR if non  >60.0  Comment:  Calculation used to obtain the estimated glomerular filtration  rate (eGFR) is the CKD-EPI equation.          Eos # 0.0       Eosinophil% 0.0       Glucose 143       Glucose, UA   Negative     Gran # (ANC) 16.1       Gran% 91.9       Hematocrit  39.6       Hemoglobin 13.2       Hyaline Casts, UA   1     Immature Grans (Abs) 0.09  Comment:  Mild elevation in immature granulocytes is non specific and   can be seen in a variety of conditions including stress response,   acute inflammation, trauma and pregnancy. Correlation with other   laboratory and clinical findings is essential.         Immature Granulocytes 0.5       Coumadin Monitoring INR 1.1  Comment:  Coumadin Therapy:  2.0 - 3.0 for INR for all indicators except mechanical heart valves  and antiphospholipid syndromes which should use 2.5 - 3.5.         Ketones, UA   Negative     Leukocytes, UA   Negative     Lymph # 0.8       Lymph% 4.7       Magnesium 1.7       MCH 31.6       MCHC 33.3       MCV 95       Microscopic Comment   SEE COMMENT  Comment:  Other formed elements not mentioned in the report are not   present in the microscopic examination.        Mono # 0.5       Mono% 2.8       MPV 12.1       NITRITE UA   Negative     Non-Squam Epith   0     nRBC 0       Occult Blood UA   Negative     pH, UA   7.0     Phosphorus 3.0       Platelet Estimate Appears normal       Platelets 173       Potassium 3.7       PROTEIN TOTAL 6.9       Protein, UA   1+  Comment:  Recommend a 24 hour urine protein or a urine   protein/creatinine ratio if globulin induced proteinuria is  clinically suspected.       Protime 10.9       RBC 4.18       RBC, UA   2     RDW 13.8       Sodium 138       Specific Gravity, UA   >1.030     Specimen UA   Urine, Clean Catch     Squam Epithel, UA   0     WBC Clumps, UA   None     WBC, UA   2     WBC 17.58       Yeast, UA   None           Significant Imaging: I have reviewed all pertinent imaging results/findings within the past 24 hours.

## 2020-01-15 NOTE — PLAN OF CARE
Pt is AAOX4, VSS. Ng tube in place, to low intermittent suction. PCA pump in place. Fall precautions in place, no report of falls. Safety measures in place, bed in lowest position, wheels locked, call light within reach. Family at bedside. Will continue to monitor.

## 2020-01-15 NOTE — PLAN OF CARE
01/15/20 1035   Discharge Assessment   Assessment Type Discharge Planning Assessment   Confirmed/corrected address and phone number on facesheet? Yes   Assessment information obtained from? Patient   Expected Length of Stay (days) 3   Communicated expected length of stay with patient/caregiver yes   Prior to hospitilization cognitive status: Alert/Oriented   Prior to hospitalization functional status: Independent   Current cognitive status: Alert/Oriented   Current Functional Status: Needs Assistance   Lives With parent(s)   Able to Return to Prior Arrangements yes   Is patient able to care for self after discharge? Yes   Who are your caregiver(s) and their phone number(s)? Paul Massey-Father    [phone]   Patient's perception of discharge disposition home or selfcare   Readmission Within the Last 30 Days no previous admission in last 30 days   Patient currently being followed by outpatient case management? No   Patient currently receives any other outside agency services? No   Equipment Currently Used at Home none   Do you have any problems affording any of your prescribed medications? TBD   Is the patient taking medications as prescribed? yes   Does the patient have transportation home? Yes   Transportation Anticipated family or friend will provide   Dialysis Name and Scheduled days N/A   Does the patient receive services at the Coumadin Clinic? No   Discharge Plan A Home with family   Discharge Plan B Home with family   DME Needed Upon Discharge  none   Patient/Family in Agreement with Plan yes     [facility] [address] [place] [address] - [address] [facility]  [address] [facility] [address]  Phone: [phone] Fax: [phone]

## 2020-01-15 NOTE — TRANSFER OF CARE
"Anesthesia Transfer of Care Note    Patient: Fernando Massey    Procedure(s) Performed: Procedure(s) (LRB):  LAPAROTOMY, EXPLORATORY (N/A)    Patient location: PACU    Anesthesia Type: general    Transport from OR: Transported from OR on room air with adequate spontaneous ventilation    Post pain: adequate analgesia    Post assessment: no apparent anesthetic complications and tolerated procedure well    Post vital signs: stable    Level of consciousness: responds to stimulation (voice)    Nausea/Vomiting: no nausea/vomiting    Complications: none    Transfer of care protocol was followed      Last vitals:   Visit Vitals  /80 (BP Location: Left arm, Patient Position: Lying)   Pulse 70   Temp 36.3 °C (97.3 °F) (Temporal)   Resp 19   Ht 5' 9" (1.753 m)   Wt 63.5 kg (140 lb)   SpO2 100%   BMI 20.67 kg/m²     "

## 2020-01-16 ENCOUNTER — TELEPHONE (OUTPATIENT)
Dept: SURGERY | Facility: HOSPITAL | Age: 25
End: 2020-01-16

## 2020-01-16 ENCOUNTER — TELEPHONE (OUTPATIENT)
Dept: SURGERY | Facility: CLINIC | Age: 25
End: 2020-01-16

## 2020-01-16 VITALS
BODY MASS INDEX: 19.41 KG/M2 | HEIGHT: 72 IN | TEMPERATURE: 99 F | DIASTOLIC BLOOD PRESSURE: 78 MMHG | HEART RATE: 60 BPM | OXYGEN SATURATION: 99 % | RESPIRATION RATE: 18 BRPM | SYSTOLIC BLOOD PRESSURE: 129 MMHG | WEIGHT: 143.31 LBS

## 2020-01-16 PROCEDURE — 25000003 PHARM REV CODE 250: Performed by: STUDENT IN AN ORGANIZED HEALTH CARE EDUCATION/TRAINING PROGRAM

## 2020-01-16 PROCEDURE — 63600175 PHARM REV CODE 636 W HCPCS: Performed by: STUDENT IN AN ORGANIZED HEALTH CARE EDUCATION/TRAINING PROGRAM

## 2020-01-16 RX ORDER — DICYCLOMINE HYDROCHLORIDE 20 MG/1
20 TABLET ORAL 2 TIMES DAILY
Qty: 20 TABLET | Refills: 0 | Status: SHIPPED | OUTPATIENT
Start: 2020-01-16 | End: 2020-02-15

## 2020-01-16 RX ORDER — ONDANSETRON 4 MG/1
4 TABLET, ORALLY DISINTEGRATING ORAL EVERY 8 HOURS PRN
Qty: 60 TABLET | Refills: 0 | Status: SHIPPED | OUTPATIENT
Start: 2020-01-16

## 2020-01-16 RX ORDER — OXYCODONE HYDROCHLORIDE 5 MG/1
5 TABLET ORAL EVERY 6 HOURS PRN
Qty: 35 TABLET | Refills: 0 | Status: SHIPPED | OUTPATIENT
Start: 2020-01-16

## 2020-01-16 RX ORDER — OXYCODONE HYDROCHLORIDE 5 MG/1
5 TABLET ORAL EVERY 4 HOURS PRN
Status: DISCONTINUED | OUTPATIENT
Start: 2020-01-16 | End: 2020-01-16 | Stop reason: HOSPADM

## 2020-01-16 RX ORDER — OXYCODONE HYDROCHLORIDE 10 MG/1
10 TABLET ORAL EVERY 4 HOURS PRN
Status: DISCONTINUED | OUTPATIENT
Start: 2020-01-16 | End: 2020-01-16 | Stop reason: HOSPADM

## 2020-01-16 RX ADMIN — METHOCARBAMOL 500 MG: 100 INJECTION INTRAMUSCULAR; INTRAVENOUS at 04:01

## 2020-01-16 RX ADMIN — Medication: at 04:01

## 2020-01-16 RX ADMIN — OXYCODONE HYDROCHLORIDE 5 MG: 5 TABLET ORAL at 09:01

## 2020-01-16 NOTE — HOSPITAL COURSE
Fernando Massey is a 24 y.o. male was admitted on 1/14 and had a negative exploratory laparotomy for intussusception seen on CT scan.  On AM of 1/16, pt was tolerating regular diet, oral pain meds, having bowel function.  Discharged in good condition.

## 2020-01-16 NOTE — HPI
Patient is a 24 y.o. male with a past medical history significant for opoid abuse who presented to the ED after acute onset abdominal pain earlier today that woke him from sleep. He describes it as a dull, continuous deep pain which he has never experienced before. The pain was associated with vomiting. He does use tramadol recreationally and was taking roughly 8-9 pills per day over the past several days, last use was 2 days ago. He is still passing flatus and having bowel movements. Upon arrival to the ED he was given zofran, phenergan and bentyl which initially improved his pain, but then it started back again with the vomiting roughly an hour later. CT scan showed small bowel small bowel intussusception. Repeat CT showed persistent intussusception.      No current facility-administered medications on file prior to encounter.       No current outpatient medications on file prior to encounter.         Review of patient's allergies indicates:  No Known Allergies     History reviewed. No pertinent past medical history.  History reviewed. No pertinent surgical history.

## 2020-01-16 NOTE — TELEPHONE ENCOUNTER
Psychiatry scripts printed and patient didn't have when he left hospital, so I sent to pharmacy.    Kaushik Gustafson MD  General Surgery, PGY-2  [phone]

## 2020-01-16 NOTE — TELEPHONE ENCOUNTER
----- Message from Bijal Jean RN sent at 1/16/2020  2:55 PM CST -----  The above patient needs a 2 weeks hospital follow up appointment for wound recheck s/p ex lap.  Please schedule the appointment and call the patient with the appointment date and time.  Thanks.

## 2020-01-16 NOTE — NURSING
Pt discharged to home . Patient request to walk. Pt given prescriptions and copy of discharged papers instructions. Pt denies pain at this time. Pt educated on signs and symptoms of when to call the doctor. All belongings with the patient . Pt verbalized understanding.

## 2020-01-16 NOTE — ASSESSMENT & PLAN NOTE
Fernando Massey is a 24 y.o. male w/ concern for intussusception on imaging and clinical concern confounded with narcotic withdrawal   s/p negative exploratory laparotomy on 1/14.    -regular diet  -d/c PCA, PO pain meds, muscle relaxer  -OOBTC  -plan for dc today vs tomorrow

## 2020-01-16 NOTE — PROGRESS NOTES
[facility]  General Surgery  Progress Note    Subjective:     History of Present Illness:  Patient is a 24 y.o. male with a past medical history significant for opoid abuse who presented to the ED after acute onset abdominal pain earlier today that woke him from sleep. He describes it as a dull, continuous deep pain which he has never experienced before. The pain was associated with vomiting. He does use tramadol recreationally and was taking roughly 8-9 pills per day over the past several days, last use was 2 days ago. He is still passing flatus and having bowel movements. Upon arrival to the ED he was given zofran, phenergan and bentyl which initially improved his pain, but then it started back again with the vomiting roughly an hour later. CT scan showed small bowel small bowel intussusception. Repeat CT showed persistent intussusception.      No current facility-administered medications on file prior to encounter.       No current outpatient medications on file prior to encounter.         Review of patient's allergies indicates:  No Known Allergies     History reviewed. No pertinent past medical history.  History reviewed. No pertinent surgical history.      Post-Op Info:  Procedure(s) (LRB):  LAPAROTOMY, EXPLORATORY (N/A)   2 Days Post-Op     Interval History:   Patient seen and examined, no acute events overnight  Pain well controlled  +F/BMx1  Tolerating regular diet with no N/V  Afebrile/VSS      Medications:  Continuous Infusions:  Scheduled Meds:   methocarbamol (ROBAXIN) IVPB  500 mg Intravenous Q8H     PRN Meds:iohexol, melatonin, ondansetron, oxyCODONE, oxyCODONE, promethazine (PHENERGAN) IVPB, sodium chloride 0.9%     Review of patient's allergies indicates:  No Known Allergies  Objective:     Vital Signs (Most Recent):  Temp: 98.1 °F (36.7 °C) (01/16/20 0408)  Pulse: 60 (01/16/20 0408)  Resp: 18 (01/16/20 0408)  BP: 120/67 (01/16/20 0408)  SpO2: 98 % (01/16/20 0408) Vital Signs  (24h Range):  Temp:  [98.1 °F (36.7 °C)-100.2 °F (37.9 °C)] 98.1 °F (36.7 °C)  Pulse:  [56-77] 60  Resp:  [16-18] 18  SpO2:  [97 %-100 %] 98 %  BP: (116-136)/(62-77) 120/67     Weight: 65 kg (143 lb 4.8 oz)  Body mass index is 19.43 kg/m².    Intake/Output - Last 3 Shifts       01/14 0700 - 01/15 0659 01/15 0700 - 01/16 0659 01/16 0700 - 01/17 0659    P.O.  150     I.V. (mL/kg) 1600 (24.6) 612 (9.4)     IV Piggyback 50      Total Intake(mL/kg) 1650 (25.4) 762 (11.7)     Urine (mL/kg/hr) 700 800 (0.5)     Drains 30 100     Other 200      Stool  0     Total Output 930 900     Net +720 -138            Stool Occurrence  1 x           Physical Exam   Constitutional: He appears well-developed and well-nourished. No distress.   HENT:   Head: Normocephalic and atraumatic.   Cardiovascular: Normal rate and regular rhythm.   Pulmonary/Chest: Effort normal. No respiratory distress.   Abdominal:   Midline incision c/d/i  Appropriately tender  Non-distended       Significant Labs:  CBC:   Recent Labs   Lab 01/15/20  0424   WBC 17.58*   RBC 4.18*   HGB 13.2*   HCT 39.6*      MCV 95   MCH 31.6*   MCHC 33.3     BMP:   Recent Labs   Lab 01/15/20  0424   *      K 3.7      CO2 23   BUN 7   CREATININE 0.8   CALCIUM 8.9   MG 1.7     CMP:   Recent Labs   Lab 01/15/20  0424   *   CALCIUM 8.9   ALBUMIN 4.1   PROT 6.9      K 3.7   CO2 23      BUN 7   CREATININE 0.8   ALKPHOS 83   ALT 11   AST 23   BILITOT 0.9     LFTs:   Recent Labs   Lab 01/15/20  0424   ALT 11   AST 23   ALKPHOS 83   BILITOT 0.9   PROT 6.9   ALBUMIN 4.1     Coagulation:   Recent Labs   Lab 01/15/20  0424   LABPROT 10.9   INR 1.1     Assessment/Plan:     Intussusception  Fernando Massey is a 24 y.o. male w/ concern for intussusception on imaging and clinical concern confounded with narcotic withdrawal   s/p negative exploratory laparotomy on 1/14.    -regular diet  -d/c PCA, PO pain meds, muscle relaxer  -OOBTC  -plan for dc today  vs tomorrow        Abby Breen PA-C   [id number]  General Surgery  [facility]

## 2020-01-16 NOTE — SUBJECTIVE & OBJECTIVE
Interval History:   Patient seen and examined, no acute events overnight  Pain well controlled  +F/BMx1  Tolerating regular diet with no N/V  Afebrile/VSS      Medications:  Continuous Infusions:  Scheduled Meds:   methocarbamol (ROBAXIN) IVPB  500 mg Intravenous Q8H     PRN Meds:iohexol, melatonin, ondansetron, oxyCODONE, oxyCODONE, promethazine (PHENERGAN) IVPB, sodium chloride 0.9%     Review of patient's allergies indicates:  No Known Allergies  Objective:     Vital Signs (Most Recent):  Temp: 98.1 °F (36.7 °C) (01/16/20 0408)  Pulse: 60 (01/16/20 0408)  Resp: 18 (01/16/20 0408)  BP: 120/67 (01/16/20 0408)  SpO2: 98 % (01/16/20 0408) Vital Signs (24h Range):  Temp:  [98.1 °F (36.7 °C)-100.2 °F (37.9 °C)] 98.1 °F (36.7 °C)  Pulse:  [56-77] 60  Resp:  [16-18] 18  SpO2:  [97 %-100 %] 98 %  BP: (116-136)/(62-77) 120/67     Weight: 65 kg (143 lb 4.8 oz)  Body mass index is 19.43 kg/m².    Intake/Output - Last 3 Shifts       01/14 0700 - 01/15 0659 01/15 0700 - 01/16 0659 01/16 0700 - 01/17 0659    P.O.  150     I.V. (mL/kg) 1600 (24.6) 612 (9.4)     IV Piggyback 50      Total Intake(mL/kg) 1650 (25.4) 762 (11.7)     Urine (mL/kg/hr) 700 800 (0.5)     Drains 30 100     Other 200      Stool  0     Total Output 930 900     Net +720 -138            Stool Occurrence  1 x           Physical Exam   Constitutional: He appears well-developed and well-nourished. No distress.   HENT:   Head: Normocephalic and atraumatic.   Cardiovascular: Normal rate and regular rhythm.   Pulmonary/Chest: Effort normal. No respiratory distress.   Abdominal:   Midline incision c/d/i  Appropriately tender  Non-distended       Significant Labs:  CBC:   Recent Labs   Lab 01/15/20  0424   WBC 17.58*   RBC 4.18*   HGB 13.2*   HCT 39.6*      MCV 95   MCH 31.6*   MCHC 33.3     BMP:   Recent Labs   Lab 01/15/20  0424   *      K 3.7      CO2 23   BUN 7   CREATININE 0.8   CALCIUM 8.9   MG 1.7     CMP:   Recent Labs   Lab  01/15/20  0424   *   CALCIUM 8.9   ALBUMIN 4.1   PROT 6.9      K 3.7   CO2 23      BUN 7   CREATININE 0.8   ALKPHOS 83   ALT 11   AST 23   BILITOT 0.9     LFTs:   Recent Labs   Lab 01/15/20  0424   ALT 11   AST 23   ALKPHOS 83   BILITOT 0.9   PROT 6.9   ALBUMIN 4.1     Coagulation:   Recent Labs   Lab 01/15/20  0424   LABPROT 10.9   INR 1.1

## 2020-01-16 NOTE — PLAN OF CARE
01/16/20 1454   Final Note   Assessment Type Final Discharge Note   Anticipated Discharge Disposition Home   What phone number can be called within the next 1-3 days to see how you are doing after discharge? [phone]   Hospital Follow Up  Appt(s) scheduled? Yes   Discharge plans and expectations educations in teach back method with documentation complete? Yes   Right Care Referral Info   Post Acute Recommendation No Care

## 2020-01-16 NOTE — DISCHARGE SUMMARY
[facility]  General Surgery  Discharge Summary      Patient Name: Fernando Massey  MRN: [id number]  Admission Date: 1/14/2020  Hospital Length of Stay: 1 days  Discharge Date and Time:  01/16/2020 12:03 PM  Attending Physician: Booker Irvin MD   Discharging Provider: Gerardo Swift MD  Primary Care Provider: Primary Doctor No     HPI: Patient is a 24 y.o. male with a past medical history significant for opoid abuse who presented to the ED after acute onset abdominal pain earlier today that woke him from sleep. He describes it as a dull, continuous deep pain which he has never experienced before. The pain was associated with vomiting. He does use tramadol recreationally and was taking roughly 8-9 pills per day over the past several days, last use was 2 days ago. He is still passing flatus and having bowel movements. Upon arrival to the ED he was given zofran, phenergan and bentyl which initially improved his pain, but then it started back again with the vomiting roughly an hour later. CT scan showed small bowel small bowel intussusception. Repeat CT showed persistent intussusception.     Procedure(s) (LRB):  LAPAROTOMY, EXPLORATORY (N/A)     Hospital Course: The patient was admitted and taken to the OR for exploratory laparotomy given continued abdominal pain, nausea, vomiting, and a repeat CT scan demonstrating persistent intussusception. There were no abnormalities of the small bowel on ex-lap. He was closed and then admitted to the surgical floor. His post op course was uncomplicated. His NGT was removed on POD#1 and his diet was advanced to clear liquids. He had return of bowel function on POD#1. He was started on a regular diet and tolerated it well. His pain was controlled with PO medications. He was discharged on POD#2 and will follow up in clinic in 2 weeks.   Addiction psych was also consulted during his admission for his opioid abuse disorder. He is to follow up with psych as needed as an  outpatient.     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    Specialties:  Behavioral Health, Psychiatry, Psychology  Why:  Call to schedule an appointment for drug counseling for tramadol addiction which is an opiate addiction.  Contact information:  [address] [facility] [address] [phone]             Schedule an appointment as soon as possible for a visit  with [facility].    Specialties:  Behavioral Health, Psychiatry, Psychology  Contact information:  [address]  [address]  [facility] [id number] [phone]             Booker Irvin MD In 2 weeks.    Specialty:  General Surgery  Why:  For wound re-check s/p ex-lap  Contact information:  [address] [facility] [id number] [phone]                 Patient Instructions:      Diet Adult Regular     Lifting restrictions   Order Comments: Avoid lifting anything heavier than 10lbs for 6 weeks     Notify your health care provider if you experience any of the following:  redness, tenderness, or signs of infection (pain, swelling, redness, odor or green/yellow discharge around incision site)     Notify your health care provider if you  experience any of the following:  severe uncontrolled pain     Notify your health care provider if you experience any of the following:  persistent nausea and vomiting or diarrhea     Notify your health care provider if you experience any of the following:  temperature >100.4     No dressing needed     Medications:  Reconciled Home Medications:      Medication List      START taking these medications    dicyclomine 20 mg tablet  Commonly known as:  BENTYL  Take 1 tablet (20 mg total) by mouth 2 (two) times daily.     ondansetron 4 MG Tbdl  Commonly known as:  ZOFRAN-ODT  Take 1 tablet (4 mg total) by mouth every 8 (eight) hours as needed.     oxyCODONE 5 MG immediate release tablet  Commonly known as:  ROXICODONE  Take 1 tablet (5 mg total) by mouth every 6 (six) hours as needed.            Gerardo Swift MD  General Surgery  [facility]

## 2022-05-03 ENCOUNTER — LAB VISIT (OUTPATIENT)
Dept: PRIMARY CARE CLINIC | Facility: OTHER | Age: 27
End: 2022-05-03
Payer: MEDICAID

## 2022-05-03 DIAGNOSIS — Z20.822 ENCOUNTER FOR LABORATORY TESTING FOR COVID-19 VIRUS: ICD-10-CM

## 2022-05-03 PROCEDURE — U0003 INFECTIOUS AGENT DETECTION BY NUCLEIC ACID (DNA OR RNA); SEVERE ACUTE RESPIRATORY SYNDROME CORONAVIRUS 2 (SARS-COV-2) (CORONAVIRUS DISEASE [COVID-19]), AMPLIFIED PROBE TECHNIQUE, MAKING USE OF HIGH THROUGHPUT TECHNOLOGIES AS DESCRIBED BY CMS-2020-01-R: HCPCS | Performed by: INTERNAL MEDICINE

## 2022-05-04 LAB
SARS-COV-2 RNA RESP QL NAA+PROBE: NOT DETECTED
SARS-COV-2- CYCLE NUMBER: NORMAL

## 2023-11-03 NOTE — ED NOTES
Pt placed on cardiac, bp and o2 monitor.    Rx for colonoscopy bowel prep Nulytely e-prescribed to pharmacy indicated by patient.

## (undated) DEVICE — SEE MEDLINE ITEM 146417

## (undated) DEVICE — BLADE 4 INCH EDGE UN-INS

## (undated) DEVICE — TOWEL OR XRAY WHITE 17X26IN

## (undated) DEVICE — SUT 0 60IN PDS II VIO MONO

## (undated) DEVICE — DRAPE INCISE IOBAN 2 23X17IN

## (undated) DEVICE — SUT VICRYL PLUS 3-0 SH 18IN

## (undated) DEVICE — ADHESIVE DERMABOND ADVANCED

## (undated) DEVICE — DRAPE ABDOMINAL TIBURON 14X11

## (undated) DEVICE — ELECTRODE REM PLYHSV RETURN 9

## (undated) DEVICE — SUT 1 48IN PDS II VIO MONO

## (undated) DEVICE — SEE MEDLINE ITEM 156902